# Patient Record
Sex: FEMALE | Race: WHITE | NOT HISPANIC OR LATINO | Employment: FULL TIME | URBAN - NONMETROPOLITAN AREA
[De-identification: names, ages, dates, MRNs, and addresses within clinical notes are randomized per-mention and may not be internally consistent; named-entity substitution may affect disease eponyms.]

---
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
## 2018-02-06 ENCOUNTER — OFFICE VISIT (OUTPATIENT)
Dept: INTERNAL MEDICINE | Facility: CLINIC | Age: 18
End: 2018-02-06

## 2018-02-06 VITALS
WEIGHT: 142.75 LBS | SYSTOLIC BLOOD PRESSURE: 113 MMHG | HEIGHT: 67 IN | HEART RATE: 98 BPM | TEMPERATURE: 99.9 F | BODY MASS INDEX: 22.4 KG/M2 | DIASTOLIC BLOOD PRESSURE: 65 MMHG | RESPIRATION RATE: 16 BRPM | OXYGEN SATURATION: 100 %

## 2018-02-06 DIAGNOSIS — R50.81 FEVER IN OTHER DISEASES: Primary | ICD-10-CM

## 2018-02-06 DIAGNOSIS — J10.1 INFLUENZA B: ICD-10-CM

## 2018-02-06 DIAGNOSIS — J06.0 ACUTE LARYNGOPHARYNGITIS: ICD-10-CM

## 2018-02-06 LAB
EXPIRATION DATE: NORMAL
EXPIRATION DATE: NORMAL
FLUAV AG NPH QL: NEGATIVE
FLUBV AG NPH QL: POSITIVE
INTERNAL CONTROL: NORMAL
INTERNAL CONTROL: NORMAL
Lab: NORMAL
Lab: NORMAL
S PYO AG THROAT QL: NEGATIVE

## 2018-02-06 PROCEDURE — 87804 INFLUENZA ASSAY W/OPTIC: CPT | Performed by: INTERNAL MEDICINE

## 2018-02-06 PROCEDURE — 87880 STREP A ASSAY W/OPTIC: CPT | Performed by: INTERNAL MEDICINE

## 2018-02-06 PROCEDURE — 99203 OFFICE O/P NEW LOW 30 MIN: CPT | Performed by: INTERNAL MEDICINE

## 2018-02-06 RX ORDER — OSELTAMIVIR PHOSPHATE 75 MG/1
75 CAPSULE ORAL 2 TIMES DAILY
Qty: 10 CAPSULE | Refills: 0 | Status: SHIPPED | OUTPATIENT
Start: 2018-02-06 | End: 2018-11-06

## 2018-02-06 NOTE — PROGRESS NOTES
Subjective   Rhona Diaz is a 17 y.o. female.     Chief Complaint   Patient presents with   • Establish Care   • Sinus Problem     Patient complains of symptoms that started last night.    • Headache   • Fever   • Sore Throat       History of Present Illness   Patient is here complaining of fever  with chills , which started last night.  She also complains of sore throat, sinus congestion and headache.  He states a lot of people at her workplace have flu.  She states the fever was high last night    Review of Systems   Constitutional: Positive for chills and fever. Negative for appetite change and fatigue.   HENT: Positive for congestion and sore throat. Negative for ear discharge, ear pain and sinus pressure.    Eyes: Negative for pain and discharge.   Respiratory: Negative for cough, chest tightness, shortness of breath and wheezing.    Cardiovascular: Negative for chest pain, palpitations and leg swelling.   Gastrointestinal: Negative for abdominal pain, blood in stool, constipation, diarrhea and nausea.   Endocrine: Negative for cold intolerance and heat intolerance.   Genitourinary: Negative for dysuria, flank pain and frequency.   Musculoskeletal: Negative for back pain and joint swelling.   Skin: Negative for color change.   Allergic/Immunologic: Negative for environmental allergies and food allergies.   Neurological: Negative for dizziness, weakness, numbness and headaches.   Hematological: Negative for adenopathy. Does not bruise/bleed easily.   Psychiatric/Behavioral: Negative for behavioral problems and dysphoric mood. The patient is not nervous/anxious.        Past Medical History:   Diagnosis Date   • Multiple allergies        History reviewed. No pertinent surgical history.    Family History   Problem Relation Age of Onset   • No Known Problems Mother    • No Known Problems Father    • No Known Problems Sister    • No Known Problems Brother    • No Known Problems Son    • No Known Problems Daughter   "  • Heart disease Maternal Grandmother    • No Known Problems Maternal Grandfather    • Heart disease Paternal Grandmother    • Arthritis Paternal Grandmother    • No Known Problems Paternal Grandfather    • No Known Problems Cousin    • Lung cancer Other    • Skin cancer Other    • Diabetes Other    • Heart attack Other    • Hypertension Other    • Hyperlipidemia Other    • Mental illness Other    • Stroke Other    • Rheum arthritis Neg Hx    • Osteoarthritis Neg Hx    • Asthma Neg Hx    • Heart failure Neg Hx    • Migraines Neg Hx    • Rashes / Skin problems Neg Hx    • Seizures Neg Hx    • Thyroid disease Neg Hx         reports that she has never smoked. She has never used smokeless tobacco. She reports that she does not drink alcohol or use illicit drugs.    Allergies   Allergen Reactions   • Cat Hair Extract    • Nuts    • Shellfish-Derived Products            Current Outpatient Prescriptions:   •  LOW-OGESTREL 0.3-30 MG-MCG per tablet, TAKE ONE TABLET BY MOUTH DAILY, Disp: 28 tablet, Rfl: 10  •  oseltamivir (TAMIFLU) 75 MG capsule, Take 1 capsule by mouth 2 (Two) Times a Day., Disp: 10 capsule, Rfl: 0      Objective   Blood pressure 113/65, pulse 98, temperature 99.9 °F (37.7 °C), temperature source Oral, resp. rate 16, height 170.2 cm (67\"), weight 64.8 kg (142 lb 12 oz), SpO2 100 %.    Physical Exam   Constitutional: She is oriented to person, place, and time. She appears well-developed and well-nourished. No distress.   HENT:   Head: Normocephalic and atraumatic.   Right Ear: External ear normal.   Left Ear: External ear normal.   Nose: Nose normal.   Mouth/Throat: Oropharyngeal exudate present.   Eyes: Conjunctivae and EOM are normal. Pupils are equal, round, and reactive to light.   Neck: Neck supple. No thyromegaly present.   Cardiovascular: Normal rate, regular rhythm and normal heart sounds.    Pulmonary/Chest: Effort normal and breath sounds normal. No respiratory distress.   Abdominal: Soft. Bowel " sounds are normal. She exhibits no distension. There is no tenderness. There is no rebound.   Musculoskeletal: Normal range of motion. She exhibits no edema.   Lymphadenopathy:     She has no cervical adenopathy.   Neurological: She is alert and oriented to person, place, and time.   No gross motor or sensory deficits   Skin: Skin is warm. She is not diaphoretic.   Psychiatric: She has a normal mood and affect.   Nursing note and vitals reviewed.        Results for orders placed or performed in visit on 02/06/18   POC Influenza A / B   Result Value Ref Range    Rapid Influenza A Ag Negative     Rapid Influenza B Ag Positive     Internal Control Passed Passed    Lot Number [id number]     Expiration Date 13164511    POC Rapid Strep A   Result Value Ref Range    Rapid Strep A Screen Negative Negative, VALID, INVALID, Not Performed    Internal Control Passed Passed    Lot Number [id number]     Expiration Date 5140924          Assessment/Plan   Rhona was seen today for establish care, sinus problem, headache, fever and sore throat.    Diagnoses and all orders for this visit:    Fever in other diseases  -     POC Influenza A / B    Acute laryngopharyngitis  -     POC Rapid Strep A    Influenza B    Other orders  -     oseltamivir (TAMIFLU) 75 MG capsule; Take 1 capsule by mouth 2 (Two) Times a Day.    plan:  1.Fever:    in office flu test positive,Oral hydration advised  2. Acute laryngopharyngitis:  Salt water gargles, oral hydration  Advised  3.  Influenza B: Start Tamiflu         Mary Bowman MD

## 2018-07-02 RX ORDER — NORGESTREL AND ETHINYL ESTRADIOL 0.3-0.03MG
KIT ORAL
Qty: 28 TABLET | Refills: 9 | OUTPATIENT
Start: 2018-07-02

## 2018-10-22 RX ORDER — NORGESTREL AND ETHINYL ESTRADIOL 0.3-0.03MG
KIT ORAL
Qty: 28 TABLET | Refills: 9 | OUTPATIENT
Start: 2018-10-22

## 2019-02-18 ENCOUNTER — LAB (OUTPATIENT)
Dept: LAB | Facility: HOSPITAL | Age: 19
End: 2019-02-18

## 2019-02-18 DIAGNOSIS — R63.4 ABNORMAL WEIGHT LOSS: ICD-10-CM

## 2019-02-18 DIAGNOSIS — Z87.09 HISTORY OF FREQUENT URI: ICD-10-CM

## 2019-02-18 DIAGNOSIS — R53.83 FATIGUE, UNSPECIFIED TYPE: ICD-10-CM

## 2019-02-18 DIAGNOSIS — J06.9 URI WITH COUGH AND CONGESTION: ICD-10-CM

## 2019-02-18 LAB
ALBUMIN SERPL-MCNC: 4.7 G/DL (ref 3.5–5)
ALBUMIN/GLOB SERPL: 1.4 G/DL (ref 1–2)
ALP SERPL-CCNC: 56 U/L (ref 38–126)
ALT SERPL W P-5'-P-CCNC: 22 U/L (ref 13–69)
ANION GAP SERPL CALCULATED.3IONS-SCNC: 13 MMOL/L (ref 10–20)
AST SERPL-CCNC: 23 U/L (ref 15–46)
BASOPHILS # BLD AUTO: 0.02 10*3/MM3 (ref 0–0.2)
BASOPHILS NFR BLD AUTO: 0.3 % (ref 0–2.5)
BILIRUB SERPL-MCNC: 0.5 MG/DL (ref 0.2–1.3)
BUN BLD-MCNC: 10 MG/DL (ref 7–20)
BUN/CREAT SERPL: 16.7 (ref 7.1–23.5)
CALCIUM SPEC-SCNC: 9.4 MG/DL (ref 8.4–10.2)
CHLORIDE SERPL-SCNC: 102 MMOL/L (ref 98–107)
CO2 SERPL-SCNC: 29 MMOL/L (ref 26–30)
CREAT BLD-MCNC: 0.6 MG/DL (ref 0.6–1.3)
DEPRECATED RDW RBC AUTO: 38.3 FL (ref 37–54)
EOSINOPHIL # BLD AUTO: 0.07 10*3/MM3 (ref 0–0.7)
EOSINOPHIL NFR BLD AUTO: 1 % (ref 0–7)
ERYTHROCYTE [DISTWIDTH] IN BLOOD BY AUTOMATED COUNT: 12.1 % (ref 11.5–14.5)
GFR SERPL CREATININE-BSD FRML MDRD: 127 ML/MIN/1.73
GLOBULIN UR ELPH-MCNC: 3.3 GM/DL
GLUCOSE BLD-MCNC: 104 MG/DL (ref 74–98)
HCT VFR BLD AUTO: 42.2 % (ref 37–47)
HGB BLD-MCNC: 13.9 G/DL (ref 12–16)
IMM GRANULOCYTES # BLD AUTO: 0.02 10*3/MM3 (ref 0–0.06)
IMM GRANULOCYTES NFR BLD AUTO: 0.3 % (ref 0–0.6)
LYMPHOCYTES # BLD AUTO: 1.23 10*3/MM3 (ref 0.6–3.4)
LYMPHOCYTES NFR BLD AUTO: 17 % (ref 10–50)
MCH RBC QN AUTO: 28.5 PG (ref 27–31)
MCHC RBC AUTO-ENTMCNC: 32.9 G/DL (ref 30–37)
MCV RBC AUTO: 86.5 FL (ref 81–99)
MONOCYTES # BLD AUTO: 0.32 10*3/MM3 (ref 0–0.9)
MONOCYTES NFR BLD AUTO: 4.4 % (ref 0–12)
NEUTROPHILS # BLD AUTO: 5.57 10*3/MM3 (ref 2–6.9)
NEUTROPHILS NFR BLD AUTO: 77 % (ref 37–80)
NRBC BLD AUTO-RTO: 0 /100 WBC (ref 0–0)
PLATELET # BLD AUTO: 257 10*3/MM3 (ref 130–400)
PMV BLD AUTO: 9.4 FL (ref 6–12)
POTASSIUM BLD-SCNC: 4 MMOL/L (ref 3.5–5.1)
PROT SERPL-MCNC: 8 G/DL (ref 6.3–8.2)
RBC # BLD AUTO: 4.88 10*6/MM3 (ref 4.2–5.4)
SODIUM BLD-SCNC: 140 MMOL/L (ref 137–145)
WBC NRBC COR # BLD: 7.23 10*3/MM3 (ref 4.8–10.8)

## 2019-02-18 PROCEDURE — 84436 ASSAY OF TOTAL THYROXINE: CPT

## 2019-02-18 PROCEDURE — 36415 COLL VENOUS BLD VENIPUNCTURE: CPT

## 2019-02-18 PROCEDURE — 84479 ASSAY OF THYROID (T3 OR T4): CPT

## 2019-02-18 PROCEDURE — 80053 COMPREHEN METABOLIC PANEL: CPT

## 2019-02-18 PROCEDURE — 85025 COMPLETE CBC W/AUTO DIFF WBC: CPT

## 2019-02-18 PROCEDURE — 84443 ASSAY THYROID STIM HORMONE: CPT

## 2019-02-19 ENCOUNTER — TELEPHONE (OUTPATIENT)
Dept: URGENT CARE | Facility: CLINIC | Age: 19
End: 2019-02-19

## 2019-02-19 LAB
FT4I SERPL CALC-MCNC: 1.8 (ref 1.2–4.9)
T3RU NFR SERPL: 25 % (ref 24–39)
T4 SERPL-MCNC: 7.1 UG/DL (ref 4.5–12)
TSH SERPL-ACNC: 0.63 UIU/ML (ref 0.45–4.5)

## 2019-02-19 NOTE — TELEPHONE ENCOUNTER
Patient called regarding OP labs, given results, all appear WNL. Advised to follow up with family doctor for on going symptoms and further work up as needed. Verbalized understanding.

## 2019-02-28 ENCOUNTER — OFFICE VISIT (OUTPATIENT)
Dept: INTERNAL MEDICINE | Facility: CLINIC | Age: 19
End: 2019-02-28

## 2019-02-28 VITALS
TEMPERATURE: 97.6 F | DIASTOLIC BLOOD PRESSURE: 84 MMHG | RESPIRATION RATE: 16 BRPM | OXYGEN SATURATION: 98 % | WEIGHT: 139 LBS | BODY MASS INDEX: 21.07 KG/M2 | HEIGHT: 68 IN | SYSTOLIC BLOOD PRESSURE: 118 MMHG | HEART RATE: 71 BPM

## 2019-02-28 DIAGNOSIS — F51.04 INSOMNIA, PSYCHOPHYSIOLOGICAL: ICD-10-CM

## 2019-02-28 DIAGNOSIS — R55 PRE-SYNCOPE: ICD-10-CM

## 2019-02-28 DIAGNOSIS — F41.9 ANXIETY: ICD-10-CM

## 2019-02-28 DIAGNOSIS — R63.4 WEIGHT LOSS: ICD-10-CM

## 2019-02-28 DIAGNOSIS — F33.0 MILD EPISODE OF RECURRENT MAJOR DEPRESSIVE DISORDER (HCC): ICD-10-CM

## 2019-02-28 DIAGNOSIS — R42 DIZZINESS: ICD-10-CM

## 2019-02-28 DIAGNOSIS — R73.01 IMPAIRED FASTING GLUCOSE: Primary | ICD-10-CM

## 2019-02-28 DIAGNOSIS — J30.1 SEASONAL ALLERGIC RHINITIS DUE TO POLLEN: ICD-10-CM

## 2019-02-28 LAB — HBA1C MFR BLD: 4.8 %

## 2019-02-28 PROCEDURE — 99214 OFFICE O/P EST MOD 30 MIN: CPT | Performed by: INTERNAL MEDICINE

## 2019-02-28 PROCEDURE — 83036 HEMOGLOBIN GLYCOSYLATED A1C: CPT | Performed by: INTERNAL MEDICINE

## 2019-02-28 NOTE — PROGRESS NOTES
Subjective   Rhona Diaz is a 18 y.o. female.     Chief Complaint   Patient presents with   • URI     follow up   • Allergies   • Dizziness   • Anxiety   • Depression   • Weight Loss   • Insomnia       History of Present Illness   Patient is here to follow-up on several urgent care visits for upper respiratory tract infection. lower respiratory tract infection and allergies.  Her urgent care records labs and radiology reports have been reviewed today. she also complains of dizziness since the past 4-5 years, she is seeing a chiropractor to help with dizziness, she has had some symptoms that she nearly fainted in the past, she has never lost consciousness, patient is also complaining of weight loss, since the past 3 months she is nearly lost 11 pounds, she works and lives in [place], she is studying at the [employer], she also works at [email or web address] from 4:00 to midnight, she complains of difficulty sleeping, she wakes up at 7 to go to school at 8 am , she also complains of anxiety and depression for several years, she states she is very stressed out with the schedule, she complains of chronic allergies and she seen an allergist in the past and was on shots,    Review of Systems   Constitutional: Positive for unexpected weight change. Negative for appetite change, fatigue and fever.   HENT: Negative for congestion, ear discharge, ear pain, sinus pressure and sore throat.         Allergies   Eyes: Negative for pain and discharge.   Respiratory: Negative for cough, chest tightness, shortness of breath and wheezing.    Cardiovascular: Negative for chest pain, palpitations and leg swelling.   Gastrointestinal: Negative for abdominal pain, blood in stool, constipation, diarrhea and nausea.   Endocrine: Negative for cold intolerance and heat intolerance.   Genitourinary: Negative for dysuria, flank pain and frequency.   Musculoskeletal: Negative for back pain and joint swelling.   Skin: Negative for color change.    Allergic/Immunologic: Negative for environmental allergies and food allergies.   Neurological: Positive for dizziness. Negative for weakness, numbness and headaches.   Hematological: Negative for adenopathy. Does not bruise/bleed easily.   Psychiatric/Behavioral: Positive for dysphoric mood and sleep disturbance. Negative for behavioral problems. The patient is nervous/anxious.        Past Medical History:   Diagnosis Date   • Multiple allergies        History reviewed. No pertinent surgical history.    Family History   Problem Relation Age of Onset   • No Known Problems Mother    • No Known Problems Father    • No Known Problems Sister    • No Known Problems Brother    • No Known Problems Son    • No Known Problems Daughter    • Heart disease Maternal Grandmother    • No Known Problems Maternal Grandfather    • Heart disease Paternal Grandmother    • Arthritis Paternal Grandmother    • No Known Problems Paternal Grandfather    • No Known Problems Cousin    • Lung cancer Other    • Skin cancer Other    • Diabetes Other    • Heart attack Other    • Hypertension Other    • Hyperlipidemia Other    • Mental illness Other    • Stroke Other    • Rheum arthritis Neg Hx    • Osteoarthritis Neg Hx    • Asthma Neg Hx    • Heart failure Neg Hx    • Migraines Neg Hx    • Rashes / Skin problems Neg Hx    • Seizures Neg Hx    • Thyroid disease Neg Hx         reports that  has never smoked. she has never used smokeless tobacco. She reports that she does not drink alcohol or use drugs.    Allergies   Allergen Reactions   • Tree Nut Anaphylaxis   • Cat Hair Extract    • Nuts    • Shellfish-Derived Products            Current Outpatient Medications:   •  albuterol (PROVENTIL HFA;VENTOLIN HFA) 108 (90 Base) MCG/ACT inhaler, Inhale 2 puffs Every 4 (Four) Hours As Needed for Wheezing or Shortness of Air (COUGH)., Disp: 18 g, Rfl: 12  •  Biotin 10 MG capsule, Take  by mouth., Disp: , Rfl:       Objective   Blood pressure 118/84, pulse  "71, temperature 97.6 °F (36.4 °C), resp. rate 16, height 171.5 cm (67.52\"), weight 63 kg (139 lb), last menstrual period 02/07/2019, SpO2 98 %.    Physical Exam   Constitutional: She is oriented to person, place, and time. She appears well-developed and well-nourished. No distress.   HENT:   Head: Normocephalic and atraumatic.   Right Ear: External ear normal.   Left Ear: External ear normal.   Nose: Nose normal.   Mouth/Throat: Oropharynx is clear and moist.   Eyes: Conjunctivae and EOM are normal. Pupils are equal, round, and reactive to light.   Neck: Neck supple. No thyromegaly present.   Cardiovascular: Normal rate, regular rhythm and normal heart sounds.   Pulmonary/Chest: Effort normal and breath sounds normal. No respiratory distress.   Abdominal: Soft. Bowel sounds are normal. She exhibits no distension. There is no tenderness. There is no rebound.   Musculoskeletal: Normal range of motion. She exhibits no edema.   Lymphadenopathy:     She has no cervical adenopathy.   Neurological: She is alert and oriented to person, place, and time.   No gross motor or sensory deficits   Skin: Skin is warm. She is not diaphoretic.   Psychiatric: She has a normal mood and affect.   Nursing note and vitals reviewed.      Patient's Body mass index is 21.44 kg/m². BMI is below normal parameters. Recommendations include: refer to gi.      Results for orders placed or performed in visit on 02/28/19   POC Glycosylated Hemoglobin (Hb A1C)   Result Value Ref Range    Hemoglobin A1C 4.8 %         Assessment/Plan   Rhona was seen today for uri, allergies, dizziness, anxiety, depression, weight loss and insomnia.    Diagnoses and all orders for this visit:    Impaired fasting glucose  -     POC Glycosylated Hemoglobin (Hb A1C)    Pre-syncope  -     Ambulatory Referral to Cardiology    Weight loss  -     Ambulatory Referral to Gastroenterology    Dizziness  -     Ambulatory Referral to Cardiology    Seasonal allergic rhinitis due to " pollen    Anxiety    Mild episode of recurrent major depressive disorder (CMS/HCC)    Insomnia, psychophysiological      Plan:  1.  Impaired glucose:  Reviewed   fasting CMP  and hba1c -done in office 4.8,    2.  Weight loss: Labs reviewed with the patient , referral to GI for further evaluation  3.  Presyncope: Labs reviewed .will  Refer patient to cardiology  4.  Allergic rhinitis: We will monitor  5.  Dizziness: Labs reviewed, will refer patient to cardiology  6.  Anxiety disorder: We will start patient on medication after cardiology evaluation  7.  Insomnia: We will start patient on medication after cardiology evaluation  8.  Major depression: We will start patient on medication after cardiology evaluation         Mary Bowman MD

## 2019-04-02 ENCOUNTER — OFFICE VISIT (OUTPATIENT)
Dept: GASTROENTEROLOGY | Facility: CLINIC | Age: 19
End: 2019-04-02

## 2019-04-02 VITALS
TEMPERATURE: 97.6 F | RESPIRATION RATE: 12 BRPM | HEIGHT: 68 IN | HEART RATE: 76 BPM | BODY MASS INDEX: 21.22 KG/M2 | DIASTOLIC BLOOD PRESSURE: 75 MMHG | SYSTOLIC BLOOD PRESSURE: 108 MMHG | WEIGHT: 140 LBS

## 2019-04-02 DIAGNOSIS — R11.0 NAUSEA: Chronic | ICD-10-CM

## 2019-04-02 DIAGNOSIS — R63.4 WEIGHT LOSS: Primary | Chronic | ICD-10-CM

## 2019-04-02 DIAGNOSIS — R13.10 DYSPHAGIA, UNSPECIFIED TYPE: Chronic | ICD-10-CM

## 2019-04-02 PROBLEM — H81.10 BENIGN PAROXYSMAL POSITIONAL VERTIGO: Status: ACTIVE | Noted: 2019-04-02

## 2019-04-02 PROBLEM — J30.9 ATOPIC RHINITIS: Status: ACTIVE | Noted: 2019-04-02

## 2019-04-02 PROCEDURE — 99214 OFFICE O/P EST MOD 30 MIN: CPT | Performed by: NURSE PRACTITIONER

## 2019-04-02 RX ORDER — ONDANSETRON 4 MG/1
4 TABLET, FILM COATED ORAL EVERY 8 HOURS PRN
Qty: 30 TABLET | Refills: 1 | Status: SHIPPED | OUTPATIENT
Start: 2019-04-02 | End: 2020-08-28 | Stop reason: SDUPTHER

## 2019-04-02 RX ORDER — ACETAMINOPHEN, ASPIRIN AND CAFFEINE 250; 250; 65 MG/1; MG/1; MG/1
1 TABLET, FILM COATED ORAL EVERY 6 HOURS PRN
COMMUNITY
End: 2020-04-07

## 2019-04-02 RX ORDER — SODIUM CHLORIDE 9 MG/ML
70 INJECTION, SOLUTION INTRAVENOUS CONTINUOUS PRN
Status: CANCELLED | OUTPATIENT
Start: 2019-04-02

## 2019-04-02 NOTE — PROGRESS NOTES
Chief Complaint   Patient presents with   • Nausea   • Vomiting   • Weight Loss   • Difficulty Swallowing     The patient has lost about 15 pounds since November 2018. This is described as unintentional. She had lost her appetite and had not been eating as much. Her weight has been stable for the past couple of weeks.     There is a long standing history of nausea. The patient has nausea when she is having migraines or dizziness. She has not noticed that she has nausea at any other time, only with dizziness and migraines. Eating does not affect nausea. She may have nausea 2-3 times per month. She may have vomiting 1-2 times per month, at times it is only dry heaving. No hematemesis. She is not taking anything for nausea. Stress seems to make nausea worse. She has been taking Excedrin Migraine for the past week or so, but before that she was taking Ibuprofen several times per week for headaches. She had forgotten to tell her PCP about her headaches at her last office visit.    There is a long standing history of difficulty swallowing. The patient has difficulty swallowing solids 1-2 times per week on average. There is no history of difficulty swallowing liquids. Stress seems to make difficulty swallowing worse.  There is no history of heartburn or reflux.    The patient denies constipation or diarrhea. There is no history of bright red blood per rectum or melena. The patient has not had a colonoscopy in the past. There is no family history of colon cancer.     Nausea   This is a chronic problem. The current episode started more than 1 year ago. The problem occurs intermittently. The problem has been unchanged. Associated symptoms include a fever, headaches, nausea, vertigo and vomiting. Pertinent negatives include no abdominal pain, arthralgias, chest pain, chills, congestion, coughing, fatigue, joint swelling, myalgias, rash or sore throat. Exacerbated by: dizziness, migraines, and stress. She has tried nothing for  the symptoms.   Weight Loss   This is a chronic problem. Episode onset: November 2018. The problem occurs intermittently. The problem has been gradually worsening. Associated symptoms include a fever, headaches, nausea, vertigo and vomiting. Pertinent negatives include no abdominal pain, arthralgias, chest pain, chills, congestion, coughing, fatigue, joint swelling, myalgias, rash or sore throat. Nothing aggravates the symptoms. She has tried nothing for the symptoms.   Difficulty Swallowing   This is a chronic problem. The problem occurs intermittently. The problem has been unchanged. Associated symptoms include a fever, headaches, nausea, vertigo and vomiting. Pertinent negatives include no abdominal pain, arthralgias, chest pain, chills, congestion, coughing, fatigue, joint swelling, myalgias, rash or sore throat. The symptoms are aggravated by eating and stress. She has tried nothing for the symptoms.     Review of Systems   Constitutional: Positive for appetite change, fever, unexpected weight change and weight loss. Negative for chills and fatigue.   HENT: Positive for nosebleeds and trouble swallowing. Negative for congestion, ear pain, mouth sores and sore throat.    Eyes: Negative for discharge and redness.   Respiratory: Negative for apnea, cough, shortness of breath and wheezing.    Cardiovascular: Negative for chest pain, palpitations and leg swelling.   Gastrointestinal: Positive for nausea and vomiting. Negative for abdominal pain, anal bleeding, blood in stool, constipation and diarrhea.   Endocrine: Negative for cold intolerance, heat intolerance and polydipsia.   Genitourinary: Negative for dysuria, hematuria and urgency.   Musculoskeletal: Negative for arthralgias, joint swelling and myalgias.   Skin: Negative for rash.   Allergic/Immunologic: Positive for food allergies (tree nuts, shellfish, tropical foods, aleena, coconut, ). Negative for immunocompromised state.   Neurological: Positive for  dizziness, vertigo and headaches. Negative for seizures and syncope.   Hematological: Negative for adenopathy. Does not bruise/bleed easily.   Psychiatric/Behavioral: Negative for dysphoric mood. The patient is nervous/anxious. The patient is not hyperactive.      Patient Active Problem List   Diagnosis   • Atopic rhinitis   • Benign paroxysmal positional vertigo   • Weight loss   • Nausea   • Dysphagia     Past Medical History:   Diagnosis Date   • Anxiety    • Depression    • Dizziness 2015   • Environmental allergies    • Migraine 2019   • Multiple allergies    • Nosebleed    • Ringing in ears    • Seasonal allergies    • Sinus problem    • Snores      Past Surgical History:   Procedure Laterality Date   • NO PAST SURGERIES       Family History   Problem Relation Age of Onset   • No Known Problems Mother    • No Known Problems Father    • No Known Problems Sister    • No Known Problems Brother    • No Known Problems Son    • No Known Problems Daughter    • Heart disease Maternal Grandmother    • No Known Problems Maternal Grandfather    • Heart disease Paternal Grandmother    • Arthritis Paternal Grandmother    • No Known Problems Paternal Grandfather    • No Known Problems Cousin    • Lung cancer Other    • Skin cancer Other    • Diabetes Other    • Heart attack Other    • Hypertension Other    • Hyperlipidemia Other    • Mental illness Other    • Stroke Other    • Rheum arthritis Neg Hx    • Osteoarthritis Neg Hx    • Asthma Neg Hx    • Heart failure Neg Hx    • Migraines Neg Hx    • Rashes / Skin problems Neg Hx    • Seizures Neg Hx    • Thyroid disease Neg Hx    • Colon cancer Neg Hx    • Cirrhosis Neg Hx    • Liver disease Neg Hx    • Liver cancer Neg Hx    • Crohn's disease Neg Hx    • Ulcerative colitis Neg Hx    • Esophageal cancer Neg Hx    • Stomach cancer Neg Hx      Social History     Tobacco Use   • Smoking status: Never Smoker   • Smokeless tobacco: Never Used   Substance Use Topics   • Alcohol use:  "No       Current Outpatient Medications:   •  aspirin-acetaminophen-caffeine (EXCEDRIN MIGRAINE) 250-250-65 MG per tablet, Take 1 tablet by mouth Every 6 (Six) Hours As Needed for Headache., Disp: , Rfl:   •  Biotin 10 MG capsule, Take  by mouth., Disp: , Rfl:   •  ondansetron (ZOFRAN) 4 MG tablet, Take 1 tablet by mouth Every 8 (Eight) Hours As Needed for Nausea or Vomiting., Disp: 30 tablet, Rfl: 1    Allergies   Allergen Reactions   • Shellfish-Derived Products Anaphylaxis   • Tree Nut Anaphylaxis   • Cat Hair Extract    • Nuts      Blood pressure 108/75, pulse 76, temperature 97.6 °F (36.4 °C), resp. rate 12, height 171.5 cm (67.5\"), weight 63.5 kg (140 lb), last menstrual period 03/31/2019, not currently breastfeeding.    Physical Exam   Constitutional: She is oriented to person, place, and time. She appears well-developed and well-nourished. No distress.   HENT:   Head: Normocephalic and atraumatic.   Right Ear: Hearing and external ear normal.   Left Ear: Hearing and external ear normal.   Nose: Nose normal.   Mouth/Throat: Oropharynx is clear and moist and mucous membranes are normal. Mucous membranes are not pale, not dry and not cyanotic. No oral lesions. No oropharyngeal exudate.   Eyes: Conjunctivae and EOM are normal. Right eye exhibits no discharge. Left eye exhibits no discharge.   Neck: Trachea normal. Neck supple. No JVD present. No edema present. No thyroid mass and no thyromegaly present.   Cardiovascular: Normal rate, regular rhythm, S2 normal and normal heart sounds. Exam reveals no gallop, no S3 and no friction rub.   No murmur heard.  Pulmonary/Chest: Effort normal and breath sounds normal. No respiratory distress. She exhibits no tenderness.   Abdominal: Normal appearance and bowel sounds are normal. She exhibits no distension, no ascites and no mass. There is no splenomegaly or hepatomegaly. There is no tenderness. There is no rigidity, no rebound and no guarding. No hernia.     Vascular " Status -  Her right foot exhibits no edema. Her left foot exhibits no edema.  Lymphadenopathy:     She has no cervical adenopathy.        Left: No supraclavicular adenopathy present.   Neurological: She is alert and oriented to person, place, and time. She has normal strength. No cranial nerve deficit or sensory deficit.   Skin: No rash noted. She is not diaphoretic. No cyanosis. No pallor. Nails show no clubbing.   Psychiatric: She has a normal mood and affect.   Nursing note and vitals reviewed.  Stigmata of chronic liver disease:  None.  Asterixis:  None.    Assessment:      ICD-10-CM ICD-9-CM   1. Weight loss R63.4 783.21   2. Nausea R11.0 787.02   3. Dysphagia, unspecified type R13.10 787.20      Discussion:  1. The patient has lost about 15 pounds since November 2018. This is described as unintentional.  2. Long-standing history of intermittent nausea.  Differentials include peptic ulcer disease, pancreatobiliary disease.  Of interest, nausea seems to occur when the patient is having dizziness or migraines.  3. Long-standing history of intermittent difficulty swallowing.  Differentials include esophagitis, esophageal dysmotility, Schatzki's ring.    Plan/  Patient Instructions   1. Zofran 4 mg 1 po every 8 hours as needed for nausea.  2. The patient may need further evaluation of migraines. The patient has appointment with PCP next week.   3. Dietary instructions.  The patient should eat relatively soft diet, and should eat in upright position and chew well.  The patient should drink water after 2-3 bites and take medications with liberal amounts of water. Furthermore after eating and taking medications, the patient should remain in upright position for 5-10 minutes.  4. Upper endoscopy-EGD: Description of the procedure, risks, benefits, alternatives and options, including nonoperative options, were discussed with the patient in detail. The patient understands and wishes to proceed.    The patient was seen  along with her mother. All questions were answered.     RITESH Fowler

## 2019-04-02 NOTE — PATIENT INSTRUCTIONS
1. Zofran 4 mg 1 po every 8 hours as needed for nausea.  2. The patient may need further evaluation of migraines. The patient has appointment with PCP next week.   3. Dietary instructions.  The patient should eat relatively soft diet, and should eat in upright position and chew well.  The patient should drink water after 2-3 bites and take medications with liberal amounts of water. Furthermore after eating and taking medications, the patient should remain in upright position for 5-10 minutes.  4. Upper endoscopy-EGD: Description of the procedure, risks, benefits, alternatives and options, including nonoperative options, were discussed with the patient in detail. The patient understands and wishes to proceed.    The patient was seen along with her mother. All questions were answered.

## 2019-04-18 ENCOUNTER — OFFICE VISIT (OUTPATIENT)
Dept: INTERNAL MEDICINE | Facility: CLINIC | Age: 19
End: 2019-04-18

## 2019-04-18 VITALS
WEIGHT: 139 LBS | HEART RATE: 70 BPM | BODY MASS INDEX: 21.07 KG/M2 | SYSTOLIC BLOOD PRESSURE: 111 MMHG | DIASTOLIC BLOOD PRESSURE: 70 MMHG | OXYGEN SATURATION: 100 % | RESPIRATION RATE: 16 BRPM | TEMPERATURE: 97.8 F | HEIGHT: 68 IN

## 2019-04-18 DIAGNOSIS — F33.0 MILD EPISODE OF RECURRENT MAJOR DEPRESSIVE DISORDER (HCC): ICD-10-CM

## 2019-04-18 DIAGNOSIS — R63.4 WEIGHT LOSS: ICD-10-CM

## 2019-04-18 DIAGNOSIS — F51.04 INSOMNIA, PSYCHOPHYSIOLOGICAL: ICD-10-CM

## 2019-04-18 DIAGNOSIS — F41.9 ANXIETY: Primary | ICD-10-CM

## 2019-04-18 PROCEDURE — 99214 OFFICE O/P EST MOD 30 MIN: CPT | Performed by: INTERNAL MEDICINE

## 2019-04-18 RX ORDER — MIRTAZAPINE 15 MG/1
15 TABLET, FILM COATED ORAL NIGHTLY
Qty: 30 TABLET | Refills: 5 | Status: SHIPPED | OUTPATIENT
Start: 2019-04-18 | End: 2019-10-19 | Stop reason: SDUPTHER

## 2019-04-24 NOTE — PROGRESS NOTES
Subjective   Rhona Diaz is a 18 y.o. female.     Chief Complaint   Patient presents with   • Follow-up   • Anxiety   • Depression   • Insomnia   • Weight Loss     nausea and weight loss; patient was recently seen by GI       History of Present Illness   Patient is here to follow-up on her weight loss, she was seen by GI, she is also to followed up on anxiety, depression and insomnia.  She states she is trying to adjust her work schedule and her school schedule and has kept back on her work hours so that she comes home early and is able to sleep better, she is also trying to eat on a regular schedule.    The following portions of the patient's history were reviewed and updated as appropriate: allergies, current medications, past family history, past medical history, past social history, past surgical history and problem list.    Review of Systems   Constitutional: Positive for unexpected weight change. Negative for appetite change, fatigue and fever.   HENT: Negative for congestion, ear discharge, ear pain, sinus pressure and sore throat.    Eyes: Negative for pain and discharge.   Respiratory: Negative for cough, chest tightness, shortness of breath and wheezing.    Cardiovascular: Negative for chest pain, palpitations and leg swelling.   Gastrointestinal: Negative for abdominal pain, blood in stool, constipation, diarrhea and nausea.   Endocrine: Negative for cold intolerance and heat intolerance.   Genitourinary: Negative for dysuria, flank pain and frequency.   Musculoskeletal: Negative for back pain and joint swelling.   Skin: Negative for color change.   Allergic/Immunologic: Negative for environmental allergies and food allergies.   Neurological: Negative for dizziness, weakness, numbness and headaches.   Hematological: Negative for adenopathy. Does not bruise/bleed easily.   Psychiatric/Behavioral: Positive for dysphoric mood and sleep disturbance. Negative for behavioral problems. The patient is  "nervous/anxious.          Current Outpatient Medications:   •  aspirin-acetaminophen-caffeine (EXCEDRIN MIGRAINE) 250-250-65 MG per tablet, Take 1 tablet by mouth Every 6 (Six) Hours As Needed for Headache., Disp: , Rfl:   •  Biotin 10 MG capsule, Take  by mouth., Disp: , Rfl:   •  ondansetron (ZOFRAN) 4 MG tablet, Take 1 tablet by mouth Every 8 (Eight) Hours As Needed for Nausea or Vomiting., Disp: 30 tablet, Rfl: 1  •  mirtazapine (REMERON) 15 MG tablet, Take 1 tablet by mouth Every Night., Disp: 30 tablet, Rfl: 5    Objective     Blood pressure 111/70, pulse 70, temperature 97.8 °F (36.6 °C), resp. rate 16, height 171.5 cm (67.5\"), weight 63 kg (139 lb), last menstrual period 03/31/2019, SpO2 100 %, not currently breastfeeding.    Physical Exam   Constitutional: She is oriented to person, place, and time. She appears well-developed and well-nourished. No distress.   HENT:   Head: Normocephalic and atraumatic.   Right Ear: External ear normal.   Left Ear: External ear normal.   Nose: Nose normal.   Mouth/Throat: Oropharynx is clear and moist.   Eyes: Conjunctivae and EOM are normal. Pupils are equal, round, and reactive to light.   Neck: Neck supple. No thyromegaly present.   Cardiovascular: Normal rate, regular rhythm and normal heart sounds.   Pulmonary/Chest: Effort normal and breath sounds normal. No respiratory distress.   Abdominal: Soft. Bowel sounds are normal. She exhibits no distension. There is no tenderness. There is no rebound.   Musculoskeletal: Normal range of motion. She exhibits no edema.   Lymphadenopathy:     She has no cervical adenopathy.   Neurological: She is alert and oriented to person, place, and time.   No gross motor or sensory deficits   Skin: Skin is warm. She is not diaphoretic.   Psychiatric: She has a normal mood and affect.   Nursing note and vitals reviewed.    Patient's Body mass index is 21.45 kg/m².        Results for orders placed or performed in visit on 02/28/19   POC " Glycosylated Hemoglobin (Hb A1C)   Result Value Ref Range    Hemoglobin A1C 4.8 %         Assessment/Plan   Rhona was seen today for follow-up, anxiety, depression, insomnia and weight loss.    Diagnoses and all orders for this visit:    Anxiety    Mild episode of recurrent major depressive disorder (CMS/HCC)    Insomnia, psychophysiological    Weight loss    Other orders  -     mirtazapine (REMERON) 15 MG tablet; Take 1 tablet by mouth Every Night.    Plan:  1.anxiety disorder: Labs reviewed, will start Remeron 15 mg daily  2.major depression: Labs reviewed, will start Remeron 15 mg daily  3.insomnia: Labs reviewed, will start Remeron 15 mg daily , sleep hygiene  4.weight loss: Labs reviewed, seen by GI and scheduled for EGD           Mary Bowman MD

## 2019-09-16 ENCOUNTER — HOSPITAL ENCOUNTER (EMERGENCY)
Facility: HOSPITAL | Age: 19
Discharge: HOME | End: 2019-09-16
Attending: EMERGENCY MEDICINE | Admitting: EMERGENCY MEDICINE

## 2019-09-16 VITALS
DIASTOLIC BLOOD PRESSURE: 67 MMHG | RESPIRATION RATE: 19 BRPM | TEMPERATURE: 98 F | OXYGEN SATURATION: 99 % | HEART RATE: 84 BPM | HEIGHT: 67 IN | WEIGHT: 150 LBS | SYSTOLIC BLOOD PRESSURE: 105 MMHG | BODY MASS INDEX: 23.54 KG/M2

## 2019-09-16 DIAGNOSIS — T78.1XXA ALLERGIC REACTION TO FOOD, INITIAL ENCOUNTER: Primary | ICD-10-CM

## 2019-09-16 PROCEDURE — 25010000002 METHYLPREDNISOLONE PER 125 MG: Performed by: PHYSICIAN ASSISTANT

## 2019-09-16 PROCEDURE — 99282 EMERGENCY DEPT VISIT SF MDM: CPT

## 2019-09-16 PROCEDURE — 96375 TX/PRO/DX INJ NEW DRUG ADDON: CPT

## 2019-09-16 PROCEDURE — 96374 THER/PROPH/DIAG INJ IV PUSH: CPT

## 2019-09-16 RX ORDER — PREDNISONE 20 MG/1
20 TABLET ORAL 3 TIMES DAILY
Qty: 15 TABLET | Refills: 0 | Status: SHIPPED | OUTPATIENT
Start: 2019-09-16 | End: 2019-09-21

## 2019-09-16 RX ORDER — FAMOTIDINE 20 MG/1
20 TABLET, FILM COATED ORAL DAILY
Qty: 15 TABLET | Refills: 0 | Status: SHIPPED | OUTPATIENT
Start: 2019-09-16 | End: 2020-04-07

## 2019-09-16 RX ORDER — SODIUM CHLORIDE 0.9 % (FLUSH) 0.9 %
10 SYRINGE (ML) INJECTION AS NEEDED
Status: DISCONTINUED | OUTPATIENT
Start: 2019-09-16 | End: 2019-09-16 | Stop reason: HOSPADM

## 2019-09-16 RX ORDER — FAMOTIDINE 10 MG/ML
20 INJECTION, SOLUTION INTRAVENOUS ONCE
Status: COMPLETED | OUTPATIENT
Start: 2019-09-16 | End: 2019-09-16

## 2019-09-16 RX ORDER — METHYLPREDNISOLONE SODIUM SUCCINATE 125 MG/2ML
125 INJECTION, POWDER, LYOPHILIZED, FOR SOLUTION INTRAMUSCULAR; INTRAVENOUS ONCE
Status: COMPLETED | OUTPATIENT
Start: 2019-09-16 | End: 2019-09-16

## 2019-09-16 RX ORDER — DIPHENHYDRAMINE HCL 25 MG
50 TABLET ORAL EVERY 6 HOURS PRN
Qty: 30 TABLET | Refills: 0 | Status: SHIPPED | OUTPATIENT
Start: 2019-09-16 | End: 2020-04-07

## 2019-09-16 RX ADMIN — SODIUM CHLORIDE 1000 ML: 9 INJECTION, SOLUTION INTRAVENOUS at 15:01

## 2019-09-16 RX ADMIN — FAMOTIDINE 20 MG: 10 INJECTION, SOLUTION INTRAVENOUS at 15:02

## 2019-09-16 RX ADMIN — METHYLPREDNISOLONE SODIUM SUCCINATE 125 MG: 125 INJECTION, POWDER, FOR SOLUTION INTRAMUSCULAR; INTRAVENOUS at 15:03

## 2019-09-16 NOTE — ED PROVIDER NOTES
"Subjective   20 y/o female that comes with c/c \"Allergic reaction\" just prior to arrival. Patient states that she was eating lunch, when started itching and developed SOA. Patient does report history of allergic reaction to nuts. Patient states that there may have been some contaminated food with what she was eating. Patient did take approx. 5-6 benadryl prior to arrival. She did however, have one episode of vomiting while in ER. Denies any other complaints at this time.         History provided by:  Patient   used: No    Allergic Reaction   Presenting symptoms: difficulty breathing, itching, rash and swelling    Severity:  Moderate  Duration:  2 days  Prior allergic episodes:  No prior episodes  Context: not animal exposure, not chemicals, not cosmetics, not dairy/milk products, not food allergies, not grass, not insect bite/sting, not medications, not new detergents/soaps and not nuts    Relieved by:  Nothing  Worsened by:  Nothing  Ineffective treatments:  Antihistamines      Review of Systems   Constitutional: Negative.  Negative for diaphoresis.   Eyes: Negative.  Negative for pain.   Respiratory: Positive for shortness of breath.    Cardiovascular: Negative.  Negative for chest pain and leg swelling.   Gastrointestinal: Positive for nausea and vomiting. Negative for abdominal distention, abdominal pain, anal bleeding, blood in stool and diarrhea.   Musculoskeletal: Negative for arthralgias and myalgias.   Skin: Positive for itching and rash. Negative for color change, pallor and wound.   Psychiatric/Behavioral: Negative.  Negative for behavioral problems, confusion, decreased concentration, dysphoric mood and hallucinations.   All other systems reviewed and are negative.      Past Medical History:   Diagnosis Date   • Anxiety    • Depression    • Dizziness 2015   • Environmental allergies    • Migraine 2019   • Multiple allergies    • Nosebleed    • Ringing in ears    • Seasonal allergies  "   • Sinus problem    • Snores        Allergies   Allergen Reactions   • Shellfish-Derived Products Anaphylaxis   • Tree Nut Anaphylaxis   • Cat Hair Extract    • Nuts        Past Surgical History:   Procedure Laterality Date   • NO PAST SURGERIES         Family History   Problem Relation Age of Onset   • No Known Problems Mother    • No Known Problems Father    • No Known Problems Sister    • No Known Problems Brother    • No Known Problems Son    • No Known Problems Daughter    • Heart disease Maternal Grandmother    • No Known Problems Maternal Grandfather    • Heart disease Paternal Grandmother    • Arthritis Paternal Grandmother    • No Known Problems Paternal Grandfather    • No Known Problems Cousin    • Lung cancer Other    • Skin cancer Other    • Diabetes Other    • Heart attack Other    • Hypertension Other    • Hyperlipidemia Other    • Mental illness Other    • Stroke Other    • Rheum arthritis Neg Hx    • Osteoarthritis Neg Hx    • Asthma Neg Hx    • Heart failure Neg Hx    • Migraines Neg Hx    • Rashes / Skin problems Neg Hx    • Seizures Neg Hx    • Thyroid disease Neg Hx    • Colon cancer Neg Hx    • Cirrhosis Neg Hx    • Liver disease Neg Hx    • Liver cancer Neg Hx    • Crohn's disease Neg Hx    • Ulcerative colitis Neg Hx    • Esophageal cancer Neg Hx    • Stomach cancer Neg Hx        Social History     Socioeconomic History   • Marital status: Single     Spouse name: Not on file   • Number of children: Not on file   • Years of education: Not on file   • Highest education level: Not on file   Tobacco Use   • Smoking status: Never Smoker   • Smokeless tobacco: Never Used   Substance and Sexual Activity   • Alcohol use: No   • Drug use: No   • Sexual activity: Defer           Objective   Physical Exam   Constitutional: She is oriented to person, place, and time. She appears well-developed and well-nourished. No distress.   HENT:   Head: Normocephalic and atraumatic.   Right Ear: External ear  normal.   Left Ear: External ear normal.   Nose: Nose normal.   Mouth/Throat: Oropharynx is clear and moist. No oropharyngeal exudate.   Eyes: Conjunctivae and EOM are normal. Pupils are equal, round, and reactive to light. Right eye exhibits no discharge. Left eye exhibits no discharge. No scleral icterus.   Neck: Normal range of motion. Neck supple. No JVD present. No tracheal deviation present. No thyromegaly present.   Cardiovascular: Normal rate, regular rhythm, normal heart sounds and intact distal pulses. Exam reveals no gallop and no friction rub.   No murmur heard.  Pulmonary/Chest: Effort normal and breath sounds normal. No stridor. No respiratory distress. She has no wheezes. She has no rales. She exhibits no tenderness.   Abdominal: Soft. Bowel sounds are normal. She exhibits no distension and no mass. There is no tenderness. There is no rebound and no guarding. No hernia.   Musculoskeletal: Normal range of motion. She exhibits no edema, tenderness or deformity.   Lymphadenopathy:     She has no cervical adenopathy.   Neurological: She is alert and oriented to person, place, and time. She displays normal reflexes. No cranial nerve deficit or sensory deficit. She exhibits normal muscle tone. Coordination normal.   Skin: Skin is warm and dry. Capillary refill takes less than 2 seconds. No rash noted. She is not diaphoretic. No erythema. No pallor.   Psychiatric: She has a normal mood and affect. Her behavior is normal. Judgment and thought content normal.   Nursing note and vitals reviewed.      Procedures           ED Course  ED Course as of Sep 16 1707   Mon Sep 16, 2019   1704 Patient does states that she feels better at this time. Came in after allergic reaction to food. Will be discharged. Advised to return if condition worsens.   [BH]      ED Course User Index  [BH] Sb Rodriguez PA-C                  [facility]    Final diagnoses:   Allergic reaction to food, initial encounter              Jennifer  Sb Logan PA-C  09/16/19 [id number]

## 2019-10-21 RX ORDER — MIRTAZAPINE 15 MG/1
TABLET, FILM COATED ORAL
Qty: 30 TABLET | Refills: 5 | Status: SHIPPED | OUTPATIENT
Start: 2019-10-21 | End: 2020-04-07

## 2020-04-04 ENCOUNTER — DOCUMENTATION (OUTPATIENT)
Dept: INTERNAL MEDICINE | Facility: CLINIC | Age: 20
End: 2020-04-04

## 2020-04-04 DIAGNOSIS — F32.9 REACTIVE DEPRESSION: Primary | ICD-10-CM

## 2020-04-04 RX ORDER — SERTRALINE HCL 25 MG
25 TABLET ORAL DAILY
Qty: 30 TABLET | Refills: 0 | Status: SHIPPED | OUTPATIENT
Start: 2020-04-04 | End: 2020-04-06 | Stop reason: SDUPTHER

## 2020-04-04 NOTE — PROGRESS NOTES
Patient contacted me via telephone for concerns of worsening depression related to the recent pandemic. She has been having thoughts of harm but no plans. Following discussions of her symptoms she feels that she has worsening depression and wishes to try medication to help with her mood. We did plan to try Zoloft 25 mg p.o. daily. Should her mood worsen, she understands that she should go to the emergency room where she can seek a counselor. I also suggested that the patient should set up an appointment on Monday with Dr. Jama to further discuss her treatment plan.

## 2020-04-06 ENCOUNTER — TELEPHONE (OUTPATIENT)
Dept: INTERNAL MEDICINE | Facility: CLINIC | Age: 20
End: 2020-04-06

## 2020-04-06 RX ORDER — SERTRALINE HYDROCHLORIDE 25 MG/1
25 TABLET, FILM COATED ORAL DAILY
Qty: 30 TABLET | Refills: 3 | Status: SHIPPED | OUTPATIENT
Start: 2020-04-06 | End: 2020-04-27 | Stop reason: SDUPTHER

## 2020-04-06 NOTE — TELEPHONE ENCOUNTER
PT CALLING IN TO REPORT THAT THE [facility] SAYS SHE NEEDS THE GENERIC FORM OF THE ZOLOFT SENT IN.  THEY DO NOT CARRY THE BRAND NAME.    VERIFIED [facility] [address], [place] - [address] [facility] - [phone]      PT CONTACT NUMBER [phone]

## 2020-04-07 ENCOUNTER — TELEMEDICINE (OUTPATIENT)
Dept: INTERNAL MEDICINE | Facility: CLINIC | Age: 20
End: 2020-04-07

## 2020-04-07 ENCOUNTER — TELEPHONE (OUTPATIENT)
Dept: INTERNAL MEDICINE | Facility: CLINIC | Age: 20
End: 2020-04-07

## 2020-04-07 DIAGNOSIS — F33.1 MODERATE EPISODE OF RECURRENT MAJOR DEPRESSIVE DISORDER (HCC): ICD-10-CM

## 2020-04-07 DIAGNOSIS — F41.9 ANXIETY: Primary | ICD-10-CM

## 2020-04-07 PROCEDURE — 99214 OFFICE O/P EST MOD 30 MIN: CPT | Performed by: INTERNAL MEDICINE

## 2020-04-07 NOTE — PROGRESS NOTES
Subjective   Rhona Diaz is a 19 y.o. female.     Chief Complaint   Patient presents with   • Anxiety   • Depression       History of Present Illness   Video visit with patient, patient is complaining of anxiety and depression, she had similar symptoms last year and was put on Remeron but she states it made her very sleepy and groggy and she was missing school, she also works at [employer], she stopped taking Remeron -October 2019, patient called on Saturday and spoke to the doctor on call and informed the doctor that she is having issues with anxiety and depression again, she is also getting very emotional secondary to covid , and the doctor on-call  recommended Zoloft, I sent her Zoloft yesterday and she took 1 pill today, and she agrees to see a therapist, she states that she has been eating and has gained some weight    The following portions of the patient's history were reviewed and updated as appropriate: allergies, current medications, past family history, past medical history, past social history, past surgical history and problem list.    Review of Systems  Psychiatry: Patient complains of anxiety and depression  Other review of systems negative per patient    Current Outpatient Medications:   •  aspirin-acetaminophen-caffeine (EXCEDRIN MIGRAINE) 250-250-65 MG per tablet, Take 1 tablet by mouth Every 6 (Six) Hours As Needed for Headache., Disp: , Rfl:   •  Biotin 10 MG capsule, Take  by mouth., Disp: , Rfl:   •  diphenhydrAMINE (BENADRYL) 25 MG tablet, Take 2 tablets by mouth Every 6 (Six) Hours As Needed for Itching., Disp: 30 tablet, Rfl: 0  •  famotidine (PEPCID) 20 MG tablet, Take 1 tablet by mouth Daily., Disp: 15 tablet, Rfl: 0  •  mirtazapine (REMERON) 15 MG tablet, TAKE ONE TABLET BY MOUTH ONCE NIGHTLY, Disp: 30 tablet, Rfl: 5  •  ondansetron (ZOFRAN) 4 MG tablet, Take 1 tablet by mouth Every 8 (Eight) Hours As Needed for Nausea or Vomiting., Disp: 30 tablet, Rfl: 1  •  sertraline (ZOLOFT) 25 MG  tablet, Take 1 tablet by mouth Daily., Disp: 30 tablet, Rfl: 3    Objective     not currently breastfeeding.    Physical Exam  Patient's There is no height or weight on file to calculate BMI.   General appearance: Normocephalic nontraumatic  HEENT: Appears benign  Neck: Appears supple  Respiratory: Effort appears normal  Musculoskeletal: Is able to move all limbs  CNS: No gross motor or sensory deficits  Psychiatry: Mood and affect appear depressed  Oriented into 3  Alert    Results for orders placed or performed in visit on 02/28/19   POC Glycosylated Hemoglobin (Hb A1C)   Result Value Ref Range    Hemoglobin A1C 4.8 %         Assessment/Plan   Rhona was seen today for anxiety and depression.    Diagnoses and all orders for this visit:    Anxiety  -     Ambulatory Referral to Psychiatry    Moderate episode of recurrent major depressive disorder (CMS/HCC)  -     Ambulatory Referral to Psychiatry      Plan:  1.anxiety disorder: Patient advised to continue Zoloft 25 mg daily and refer patient to psychiatry  2.  Major depression: Continue Zoloft 25 mg daily and refer patient to psychiatry  Patient advised to call the clinic if her symptoms worsen             Mary Bowman MD

## 2020-04-07 NOTE — TELEPHONE ENCOUNTER
Called patient to verify her insurance for her MyChart video visit today. She is going to call [facility] to find out what is wrong with it.

## 2020-04-27 ENCOUNTER — TELEMEDICINE (OUTPATIENT)
Dept: INTERNAL MEDICINE | Facility: CLINIC | Age: 20
End: 2020-04-27

## 2020-04-27 DIAGNOSIS — F41.9 ANXIETY: Primary | ICD-10-CM

## 2020-04-27 DIAGNOSIS — F33.0 MILD EPISODE OF RECURRENT MAJOR DEPRESSIVE DISORDER (HCC): ICD-10-CM

## 2020-04-27 PROCEDURE — 99214 OFFICE O/P EST MOD 30 MIN: CPT | Performed by: INTERNAL MEDICINE

## 2020-04-27 RX ORDER — HYDROXYZINE HYDROCHLORIDE 10 MG/1
10 TABLET, FILM COATED ORAL NIGHTLY PRN
Qty: 30 TABLET | Refills: 3 | Status: SHIPPED | OUTPATIENT
Start: 2020-04-27 | End: 2020-10-13 | Stop reason: SDUPTHER

## 2020-04-27 NOTE — PROGRESS NOTES
Subjective   Rhona Diaz is a 19 y.o. female.     Chief Complaint   Patient presents with   • Anxiety   • Depression       History of Present Illness   You have chosen to receive care through a telehealth visit.  Do you consent to use a video/audio connection for your medical care today? YES     Video conversation with patient today, patient is following up on anxiety and depression, she was put on Zoloft at the last visit which she has been taking, she states she is not having any effect from the Zoloft and is been 3 weeks since she started it, she states her symptoms of anxiety are worse than depression    During today's visit, I reviewed the documented allergies, medications, chief complaint, and pertinent vitals.  I have confirmed with the patient that there have been no changes since this information was discussed with my clinical team member.    The following portions of the patient's history were reviewed and updated as appropriate: allergies, current medications, past family history, past medical history, past social history, past surgical history and problem list.    Review of Systems   Constitutional: Negative for appetite change, fatigue and fever.   HENT: Negative for congestion, ear discharge, ear pain, sinus pressure and sore throat.    Eyes: Negative for pain and discharge.   Respiratory: Negative for cough, chest tightness, shortness of breath and wheezing.    Cardiovascular: Negative for chest pain, palpitations and leg swelling.   Gastrointestinal: Negative for abdominal pain, blood in stool, constipation, diarrhea and nausea.   Endocrine: Negative for cold intolerance and heat intolerance.   Genitourinary: Negative for dysuria, flank pain and frequency.   Musculoskeletal: Negative for back pain and joint swelling.   Skin: Negative for color change.   Allergic/Immunologic: Negative for environmental allergies and food allergies.   Neurological: Negative for dizziness, weakness, numbness and  headaches.   Hematological: Negative for adenopathy. Does not bruise/bleed easily.   Psychiatric/Behavioral: Positive for dysphoric mood. Negative for behavioral problems. The patient is nervous/anxious.          Current Outpatient Medications:   •  hydrOXYzine (ATARAX) 10 MG tablet, Take 1 tablet by mouth At Night As Needed for Anxiety (sleep)., Disp: 30 tablet, Rfl: 3  •  ondansetron (ZOFRAN) 4 MG tablet, Take 1 tablet by mouth Every 8 (Eight) Hours As Needed for Nausea or Vomiting., Disp: 30 tablet, Rfl: 1  •  sertraline (ZOLOFT) 50 MG tablet, Take 1 tablet by mouth Daily., Disp: 30 tablet, Rfl: 5    Objective     not currently breastfeeding.    Physical Exam  All vitals recorded within this visit are reported by the patient.  General appearance: Normocephalic and nontraumatic  HEENT: Appears benign, hearing appears intact  Neck: Appears supple  Respiratory: Effort appears normal  Musculoskeletal: Moving all limbs  CNS: No gross motor or sensory deficits  Psychiatry: Alert and oriented x3  Memory appears intact  Mood and affect appears normal  Insight appears normal        Results for orders placed or performed in visit on 02/28/19   POC Glycosylated Hemoglobin (Hb A1C)   Result Value Ref Range    Hemoglobin A1C 4.8 %         Assessment/Plan   Rhona was seen today for anxiety and depression.    Diagnoses and all orders for this visit:    Anxiety    Mild episode of recurrent major depressive disorder (CMS/[facility])    Other orders  -     sertraline (ZOLOFT) 50 MG tablet; Take 1 tablet by mouth Daily.  -     hydrOXYzine (ATARAX) 10 MG tablet; Take 1 tablet by mouth At Night As Needed for Anxiety (sleep).    Plan:  1.anxiety disorder: We will increase to Zoloft 50 mg p.o. daily, PRN hydroxyzine  2.  Major depression: Increase to Zoloft 50 mg daily    This was an audio and video enabled telemedicine encounter.           Mary Bowman MD

## 2020-08-28 ENCOUNTER — APPOINTMENT (OUTPATIENT)
Dept: CT IMAGING | Facility: HOSPITAL | Age: 20
End: 2020-08-28

## 2020-08-28 ENCOUNTER — HOSPITAL ENCOUNTER (EMERGENCY)
Facility: HOSPITAL | Age: 20
Discharge: HOME | End: 2020-08-28
Attending: EMERGENCY MEDICINE | Admitting: EMERGENCY MEDICINE

## 2020-08-28 VITALS
WEIGHT: 136 LBS | OXYGEN SATURATION: 99 % | TEMPERATURE: 98 F | RESPIRATION RATE: 14 BRPM | HEART RATE: 68 BPM | DIASTOLIC BLOOD PRESSURE: 64 MMHG | HEIGHT: 67 IN | SYSTOLIC BLOOD PRESSURE: 106 MMHG | BODY MASS INDEX: 21.35 KG/M2

## 2020-08-28 DIAGNOSIS — S09.90XA INJURY OF HEAD, INITIAL ENCOUNTER: Primary | ICD-10-CM

## 2020-08-28 DIAGNOSIS — R11.0 NAUSEA: Chronic | ICD-10-CM

## 2020-08-28 LAB — B-HCG UR QL: NEGATIVE

## 2020-08-28 PROCEDURE — 99283 EMERGENCY DEPT VISIT LOW MDM: CPT

## 2020-08-28 PROCEDURE — 63710000001 ONDANSETRON ODT 4 MG TABLET DISPERSIBLE: Performed by: EMERGENCY MEDICINE

## 2020-08-28 PROCEDURE — 70450 CT HEAD/BRAIN W/O DYE: CPT

## 2020-08-28 PROCEDURE — 81025 URINE PREGNANCY TEST: CPT | Performed by: EMERGENCY MEDICINE

## 2020-08-28 RX ORDER — BUTALBITAL, ACETAMINOPHEN AND CAFFEINE 50; 325; 40 MG/1; MG/1; MG/1
1 TABLET ORAL EVERY 6 HOURS PRN
Qty: 10 TABLET | Refills: 0 | Status: SHIPPED | OUTPATIENT
Start: 2020-08-28 | End: 2020-10-13

## 2020-08-28 RX ORDER — ONDANSETRON 4 MG/1
4 TABLET, ORALLY DISINTEGRATING ORAL ONCE
Status: COMPLETED | OUTPATIENT
Start: 2020-08-28 | End: 2020-08-28

## 2020-08-28 RX ORDER — ONDANSETRON 2 MG/ML
4 INJECTION INTRAMUSCULAR; INTRAVENOUS ONCE
Status: DISCONTINUED | OUTPATIENT
Start: 2020-08-28 | End: 2020-08-28

## 2020-08-28 RX ORDER — BUTALBITAL, ACETAMINOPHEN AND CAFFEINE 50; 325; 40 MG/1; MG/1; MG/1
1 TABLET ORAL ONCE
Status: COMPLETED | OUTPATIENT
Start: 2020-08-28 | End: 2020-08-28

## 2020-08-28 RX ORDER — ONDANSETRON 4 MG/1
4 TABLET, FILM COATED ORAL EVERY 8 HOURS PRN
Qty: 10 TABLET | Refills: 0 | Status: SHIPPED | OUTPATIENT
Start: 2020-08-28 | End: 2020-10-13

## 2020-08-28 RX ADMIN — BUTALBITAL, ACETAMINOPHEN AND CAFFEINE 1 TABLET: 50; 325; 40 TABLET ORAL at 09:21

## 2020-08-28 RX ADMIN — ONDANSETRON 4 MG: 4 TABLET, ORALLY DISINTEGRATING ORAL at 09:22

## 2020-09-04 ENCOUNTER — OFFICE VISIT (OUTPATIENT)
Dept: INTERNAL MEDICINE | Facility: CLINIC | Age: 20
End: 2020-09-04

## 2020-09-04 VITALS
DIASTOLIC BLOOD PRESSURE: 62 MMHG | SYSTOLIC BLOOD PRESSURE: 100 MMHG | OXYGEN SATURATION: 98 % | WEIGHT: 139 LBS | HEART RATE: 91 BPM | BODY MASS INDEX: 21.82 KG/M2 | TEMPERATURE: 98 F | HEIGHT: 67 IN

## 2020-09-04 DIAGNOSIS — F51.3 SLEEP WALKING: ICD-10-CM

## 2020-09-04 DIAGNOSIS — F51.04 INSOMNIA, PSYCHOPHYSIOLOGICAL: ICD-10-CM

## 2020-09-04 DIAGNOSIS — F41.9 ANXIETY: ICD-10-CM

## 2020-09-04 DIAGNOSIS — Z09 FOLLOW UP: Primary | ICD-10-CM

## 2020-09-04 DIAGNOSIS — R40.0 HAS DAYTIME DROWSINESS: ICD-10-CM

## 2020-09-04 DIAGNOSIS — Z23 NEED FOR INFLUENZA VACCINATION: ICD-10-CM

## 2020-09-04 DIAGNOSIS — F33.1 MODERATE EPISODE OF RECURRENT MAJOR DEPRESSIVE DISORDER (HCC): ICD-10-CM

## 2020-09-04 PROCEDURE — 90686 IIV4 VACC NO PRSV 0.5 ML IM: CPT | Performed by: NURSE PRACTITIONER

## 2020-09-04 PROCEDURE — 99214 OFFICE O/P EST MOD 30 MIN: CPT | Performed by: NURSE PRACTITIONER

## 2020-09-04 PROCEDURE — 90471 IMMUNIZATION ADMIN: CPT | Performed by: NURSE PRACTITIONER

## 2020-09-04 NOTE — PROGRESS NOTES
"Date: 2020    Name: Rhona Diaz  : 2000    Chief Complaint:   Chief Complaint   Patient presents with   • Follow-up     ER       HPI:  Rhona Diaz is a 20 y.o. female presents for follow up of [facility] ED visit on 2020.  She reported that she believed she had fallen, hit the back of her head.  Uncertain regarding circumstances surrounding head injury.  Noted a knot on the back of her head, with associated headache, nausea, dizziness.  PE normal.  CT of the head without contrast indicated no acute intracranial findings.  Urine pregnancy test negative.  She is feeling much better today.  She is concerned she may have been sleepwalking, has been sleepwalking since she was very young.  Does have difficulty sleeping, occasionally.  Feels when she is not resting well, sleepwalking episodes increase.  Has been taking hydroxyzine 10 mg at bedtime, states it is not always effective.  She is often sleepy during the day.  Does not snore.  She has also been feeling anxious, slightly depressed.  Describes anhedonia, racing thoughts, excessive worry, irritability, decreased concentration.  Denies HI, SI, feelings of worthlessness.  Currently taking Zoloft 20 mg, states she feels it would be more effective at higher dose.    History: The following portions of the patient's history were reviewed and updated as appropriate: allergies, current medications, past medical history, family history, surgical history, social history and problem list.      ROS:  Review of Systems   Constitutional: Negative.    Eyes: Negative.    Respiratory: Negative.    Cardiovascular: Negative.    Musculoskeletal: Negative for myalgias.   Skin: Negative for pallor and rash.   Neurological: Negative.    Hematological: Does not bruise/bleed easily.       VS:  Vitals:    20 1715   BP: 100/62   Pulse: 91   Temp: 98 °F (36.7 °C)   TempSrc: Temporal   SpO2: 98%   Weight: 63 kg (139 lb)   Height: 170.2 cm (67\")     Body mass index is 21.77 " kg/m².  PE:  Physical Exam   Constitutional: She is oriented to person, place, and time. She appears well-developed and well-nourished. No distress.   HENT:   Head: Normocephalic.   Right Ear: External ear normal.   Left Ear: External ear normal.   Eyes: Pupils are equal, round, and reactive to light. Conjunctivae and EOM are normal.   Neck: Normal range of motion. Neck supple.   Cardiovascular: Normal rate, regular rhythm, normal heart sounds and intact distal pulses.   Pulmonary/Chest: Effort normal and breath sounds normal.   Neurological: She is alert and oriented to person, place, and time. She has normal strength. No sensory deficit. Coordination and gait normal.   Cranial nerves II through XII normal   Skin: Skin is warm. Capillary refill takes less than 2 seconds.   Psychiatric: She has a normal mood and affect. Her behavior is normal.       Assessment/Plan:  Rhona was seen today for follow-up.    Diagnoses and all orders for this visit:    Follow up        - Monitor for headache, vision changes, slurred speech, increased fatigue/drowsiness.  Return to clinic or go to ED depending on timing and severity of symptoms.      Anxiety  Moderate episode of recurrent major depressive disorder (CMS/HCC)  -     sertraline (ZOLOFT) 50 MG tablet; Take 1 tablet by mouth Daily. Increased from 25 mg daily.        - Encouraged to take part in daily physical exercise.          - Eat healthy, well balanced diet; avoid sugary foods or beverages        - Limit alcohol intake        - Ensure good night's sleep by creating calm space in bedroom, avoiding screen time 1-2 hours before bed, no caffeine after 5 pm        - Talk to supportive family and friends, as needed        - Consider journaling, other creative way to express feelings, if needed    Insomnia, psychophysiological  -     Ambulatory Referral to Sleep Medicine  - Continue taking hydroxyzine 10 mg prn insomnia  Sleep walking  -     Ambulatory Referral to Sleep  Medicine  Has daytime drowsiness  -     Ambulatory Referral to Sleep Medicine    Need for influenza vaccination  -     Fluarix/Fluzone/Afluria Quad>6 Months        Return in about 4 weeks (around 10/2/2020) for Next scheduled follow up.

## 2020-10-13 ENCOUNTER — OFFICE VISIT (OUTPATIENT)
Dept: INTERNAL MEDICINE | Facility: CLINIC | Age: 20
End: 2020-10-13

## 2020-10-13 VITALS
TEMPERATURE: 97.8 F | HEART RATE: 69 BPM | HEIGHT: 67 IN | OXYGEN SATURATION: 97 % | SYSTOLIC BLOOD PRESSURE: 105 MMHG | DIASTOLIC BLOOD PRESSURE: 72 MMHG | BODY MASS INDEX: 21.82 KG/M2 | WEIGHT: 139 LBS | RESPIRATION RATE: 16 BRPM

## 2020-10-13 DIAGNOSIS — E78.2 MIXED HYPERLIPIDEMIA: ICD-10-CM

## 2020-10-13 DIAGNOSIS — F41.9 ANXIETY: Primary | ICD-10-CM

## 2020-10-13 DIAGNOSIS — F33.1 MODERATE EPISODE OF RECURRENT MAJOR DEPRESSIVE DISORDER (HCC): ICD-10-CM

## 2020-10-13 PROCEDURE — 99214 OFFICE O/P EST MOD 30 MIN: CPT | Performed by: INTERNAL MEDICINE

## 2020-10-13 RX ORDER — HYDROXYZINE HYDROCHLORIDE 10 MG/1
10 TABLET, FILM COATED ORAL EVERY 12 HOURS PRN
Qty: 30 TABLET | Refills: 5 | Status: SHIPPED | OUTPATIENT
Start: 2020-10-13 | End: 2020-11-16 | Stop reason: SDUPTHER

## 2020-10-13 RX ORDER — SERTRALINE HYDROCHLORIDE 100 MG/1
100 TABLET, FILM COATED ORAL DAILY
Qty: 30 TABLET | Refills: 4 | Status: SHIPPED | OUTPATIENT
Start: 2020-10-13 | End: 2020-11-23 | Stop reason: SDUPTHER

## 2020-10-13 NOTE — PROGRESS NOTES
Subjective   Rhona Diaz is a 20 y.o. female.     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  Have you had these symptoms before?: Yes  How long have you been having these symptoms?: Greater than 2 weeks  Please list any medications you are currently taking for this condition.: Currently taking Zoloft for the depression.      The following portions of the patient's history were reviewed and updated as appropriate: allergies, current medications, past family history, past medical history, past social history, past surgical history and problem list.    Review of Systems   Constitutional: Negative for appetite change, fatigue and fever.   HENT: Negative for congestion, ear discharge, ear pain, sinus pressure and sore throat.    Eyes: Negative for pain and discharge.   Respiratory: Negative for cough, chest tightness, shortness of breath and wheezing.    Cardiovascular: Negative for chest pain, palpitations and leg swelling.   Gastrointestinal: Negative for abdominal pain, blood in stool, constipation, diarrhea and nausea.   Endocrine: Negative for cold intolerance and heat intolerance.   Genitourinary: Negative for dysuria, flank pain and frequency.   Musculoskeletal: Negative for back pain and joint swelling.  "  Skin: Negative for color change.   Allergic/Immunologic: Negative for environmental allergies and food allergies.   Neurological: Negative for dizziness, weakness, numbness and headaches.   Hematological: Negative for adenopathy. Does not bruise/bleed easily.   Psychiatric/Behavioral: Positive for dysphoric mood. Negative for behavioral problems. The patient is nervous/anxious.          Current Outpatient Medications:   •  hydrOXYzine (ATARAX) 10 MG tablet, Take 1 tablet by mouth Every 12 (Twelve) Hours As Needed for Anxiety (sleep)., Disp: 30 tablet, Rfl: 5  •  sertraline (ZOLOFT) 100 MG tablet, Take 1 tablet by mouth Daily., Disp: 30 tablet, Rfl: 4    Objective     Blood pressure 105/72, pulse 69, temperature 97.8 °F (36.6 °C), resp. rate 16, height 170.2 cm (67.01\"), weight 63 kg (139 lb), SpO2 97 %, not currently breastfeeding.    Physical Exam  Vitals signs and nursing note reviewed.   Constitutional:       General: She is not in acute distress.     Appearance: She is well-developed. She is not diaphoretic.   HENT:      Head: Normocephalic and atraumatic.      Right Ear: External ear normal.      Left Ear: External ear normal.      Nose: Nose normal.   Eyes:      Conjunctiva/sclera: Conjunctivae normal.      Pupils: Pupils are equal, round, and reactive to light.   Neck:      Musculoskeletal: Neck supple.      Thyroid: No thyromegaly.   Cardiovascular:      Rate and Rhythm: Normal rate and regular rhythm.      Heart sounds: Normal heart sounds.   Pulmonary:      Effort: Pulmonary effort is normal. No respiratory distress.      Breath sounds: Normal breath sounds.   Abdominal:      General: Bowel sounds are normal. There is no distension.      Palpations: Abdomen is soft.      Tenderness: There is no abdominal tenderness. There is no rebound.   Musculoskeletal: Normal range of motion.   Lymphadenopathy:      Cervical: No cervical adenopathy.   Skin:     General: Skin is warm.   Neurological:      Mental " Status: She is alert and oriented to person, place, and time.      Comments: No gross motor or sensory deficits       Patient's Body mass index is 21.77 kg/m². BMI is within normal parameters. No follow-up required..      Results for orders placed or performed during the hospital encounter of 08/28/20   Pregnancy, Urine - Urine, Clean Catch    Specimen: Urine, Clean Catch   Result Value Ref Range    HCG, Urine QL Negative Negative         Assessment/Plan   Diagnoses and all orders for this visit:    1. Anxiety (Primary)  -     Ambulatory Referral to Psychiatry  -     TSH    2. Moderate episode of recurrent major depressive disorder (CMS/HCC)  -     Ambulatory Referral to Psychiatry    3. Mixed hyperlipidemia  -     CBC & Differential  -     Comprehensive Metabolic Panel  -     Lipid Panel    Other orders  -     sertraline (ZOLOFT) 100 MG tablet; Take 1 tablet by mouth Daily.  Dispense: 30 tablet; Refill: 4  -     hydrOXYzine (ATARAX) 10 MG tablet; Take 1 tablet by mouth Every 12 (Twelve) Hours As Needed for Anxiety (sleep).  Dispense: 30 tablet; Refill: 5    Plan:  1.mixed hyperlipidemia: Diet and exercise counseled, will obtain labs  2.  Anxiety disorder: We will increase Zoloft 100 mg daily, obtain labs and refer patient to psychiatry, also will increase to hydroxyzine 10 mg p.o. twice daily as needed  3.  Major depression: We will obtain labs, increase Zoloft  to 100 mg daily and refer patient to psychiatry             Mary Bowman MD

## 2020-10-15 ENCOUNTER — OFFICE VISIT (OUTPATIENT)
Dept: BEHAVIORAL HEALTH | Facility: CLINIC | Age: 20
End: 2020-10-15

## 2020-10-15 VITALS
HEIGHT: 67 IN | DIASTOLIC BLOOD PRESSURE: 64 MMHG | OXYGEN SATURATION: 98 % | TEMPERATURE: 97.2 F | HEART RATE: 83 BPM | BODY MASS INDEX: 21.82 KG/M2 | SYSTOLIC BLOOD PRESSURE: 106 MMHG | WEIGHT: 139 LBS

## 2020-10-15 DIAGNOSIS — F41.1 GENERALIZED ANXIETY DISORDER: ICD-10-CM

## 2020-10-15 DIAGNOSIS — F31.81 BIPOLAR II DISORDER (HCC): Primary | ICD-10-CM

## 2020-10-15 PROCEDURE — 90792 PSYCH DIAG EVAL W/MED SRVCS: CPT | Performed by: NURSE PRACTITIONER

## 2020-10-15 RX ORDER — ARIPIPRAZOLE 5 MG/1
5 TABLET ORAL DAILY
Qty: 30 TABLET | Refills: 3 | Status: SHIPPED | OUTPATIENT
Start: 2020-10-15 | End: 2020-11-05 | Stop reason: SINTOL

## 2020-10-15 NOTE — PROGRESS NOTES
Patient Name: Rhona Diaz  MRN: [id number]   :  2000     Referring Physician: Mary Bowman MD    Chief Complaint:     ICD-10-CM ICD-9-CM   1. Bipolar II disorder (CMS/[facility])  F31.81 296.89   2. Generalized anxiety disorder  F41.1 300.02       HPI:   Rhona Diaz is a 20 y.o. female who is here today for initial evaluation of Anxiety , Bipolar Disorder and Depression.  Patient states 2 years ago she started antidepressants.  States if she thinks about it she can look back and see that she had depression issues before that as well.  Has a history of self-harm with growing fingers nails out to scratch her wrist.  Has many episodes of feeling an out of body experience as she is outside looking in.  Has had to pull over and have friends finish driving when this has happened.  Patient is currently a  and has a follow-up break this week.  Feels like symptoms have been worse being at home with nothing to distract herself.  Patient feels like she does have times where she feels fine and even time she has excessive spending or feeling extra energy then she has 2 or 3 weeks of feeling extremely depressed.  When she gets anxious she gets a headache and her chest feels tight.  Patient has significant sleep issues.  She sleepwalks and has had a concussion as a result of this.  Feels she has a good sleep hygiene routine and tries her best to get 8 hours of sleep at night.  Has difficulty falling asleep patient does feel like out of the medications she is tried Zoloft has helped the most.  States that has made her levels not as low.  Patient is also interested in therapy.    Past Medical History:   Past Medical History:   Diagnosis Date   • Anxiety    • Depression    • Dizziness    • Environmental allergies    • Migraine    • Multiple allergies    • Nosebleed    • Ringing in ears    • Seasonal allergies    • Sinus problem    • Snores        Past Surgical History:   Past Surgical History:    Procedure Laterality Date   • NO PAST SURGERIES         Social History:   Social History     Socioeconomic History   • Marital status: Single     Spouse name: Not on file   • Number of children: Not on file   • Years of education: Not on file   • Highest education level: Not on file   Tobacco Use   • Smoking status: Never Smoker   • Smokeless tobacco: Never Used   Substance and Sexual Activity   • Alcohol use: No   • Drug use: No   • Sexual activity: Defer       Family History:  Family History   Problem Relation Age of Onset   • No Known Problems Mother    • No Known Problems Father    • No Known Problems Sister    • No Known Problems Brother    • No Known Problems Son    • No Known Problems Daughter    • Heart disease Maternal Grandmother    • No Known Problems Maternal Grandfather    • Heart disease Paternal Grandmother    • Arthritis Paternal Grandmother    • No Known Problems Paternal Grandfather    • No Known Problems Cousin    • Lung cancer Other    • Skin cancer Other    • Diabetes Other    • Heart attack Other    • Hypertension Other    • Hyperlipidemia Other    • Mental illness Other    • Stroke Other    • Rheum arthritis Neg Hx    • Osteoarthritis Neg Hx    • Asthma Neg Hx    • Heart failure Neg Hx    • Migraines Neg Hx    • Rashes / Skin problems Neg Hx    • Seizures Neg Hx    • Thyroid disease Neg Hx    • Colon cancer Neg Hx    • Cirrhosis Neg Hx    • Liver disease Neg Hx    • Liver cancer Neg Hx    • Crohn's disease Neg Hx    • Ulcerative colitis Neg Hx    • Esophageal cancer Neg Hx    • Stomach cancer Neg Hx        Allergy:  Allergies   Allergen Reactions   • Shellfish-Derived Products Anaphylaxis   • Tree Nut Anaphylaxis   • Cat Hair Extract    • Nuts        Current Medications:   Current Outpatient Medications   Medication Sig Dispense Refill   • hydrOXYzine (ATARAX) 10 MG tablet Take 1 tablet by mouth Every 12 (Twelve) Hours As Needed for Anxiety (sleep). 30 tablet 5   • sertraline (ZOLOFT) 100 MG  tablet Take 1 tablet by mouth Daily. 30 tablet 4   • ARIPiprazole (ABILIFY) 5 MG tablet Take 1 tablet by mouth Daily. 30 tablet 3     No current facility-administered medications for this visit.        Lab Results:   Admission on 08/28/2020, Discharged on 08/28/2020   Component Date Value Ref Range Status   • HCG, Urine QL 08/28/2020 Negative  Negative Final       Review of Symptoms:   Review of Systems   Constitutional: Negative for activity change, appetite change, fatigue, unexpected weight gain and unexpected weight loss.   Respiratory: Negative for shortness of breath and wheezing.    Gastrointestinal: Negative for constipation, diarrhea, nausea and vomiting.   Musculoskeletal: Negative for gait problem.   Skin: Negative for dry skin and rash.   Neurological: Negative for dizziness, speech difficulty, weakness, light-headedness, headache, memory problem and confusion.   Psychiatric/Behavioral: Positive for dysphoric mood, depressed mood and stress. Negative for agitation, behavioral problems, decreased concentration, hallucinations, self-injury, sleep disturbance, suicidal ideas and negative for hyperactivity. The patient is nervous/anxious.        Physical Exam:   Physical Exam  Vitals signs and nursing note reviewed.   Constitutional:       General: She is not in acute distress.     Appearance: She is well-developed. She is not diaphoretic.   HENT:      Head: Normocephalic and atraumatic.   Eyes:      Conjunctiva/sclera: Conjunctivae normal.   Neck:      Musculoskeletal: Full passive range of motion without pain and normal range of motion.   Cardiovascular:      Rate and Rhythm: Normal rate.   Pulmonary:      Effort: Pulmonary effort is normal. No respiratory distress.   Musculoskeletal: Normal range of motion.   Skin:     General: Skin is warm and dry.   Neurological:      Mental Status: She is alert and oriented to person, place, and time.   Psychiatric:         Mood and Affect: Mood is anxious and  "depressed. Affect is not labile, blunt, angry or inappropriate.         Speech: Speech is not rapid and pressured or tangential.         Behavior: Behavior normal. Behavior is not agitated, slowed, aggressive, withdrawn, hyperactive or combative. Behavior is cooperative.         Thought Content: Thought content normal. Thought content is not paranoid or delusional. Thought content does not include homicidal or suicidal ideation. Thought content does not include homicidal or suicidal plan.         Judgment: Judgment normal.       Blood pressure 106/64, pulse 83, temperature 97.2 °F (36.2 °C), height 170.2 cm (67.01\"), weight 63 kg (139 lb), SpO2 98 %, not currently breastfeeding.  Body mass index is 21.76 kg/m².     Mental Status Exam:   Appearance: appropriate  Hygiene:   good  Cooperation:  Cooperative  Eye Contact:  Good  Psychomotor Behavior:  Appropriate  Mood:anxious, depressed and dysthymic  Affect:  Appropriate  Hopelessness: Denies  Speech:  Normal  Thought Process:  Goal directed  Thought Content:  Normal  Suicidal:  None  Homicidal:  None  Hallucinations:  None  Delusion:  None  Memory:  Intact  Orientation:  Person, Place, Time and Situation  Reliability:  good  Insight:  Good  Judgement:  Good  Impulse Control:  Good  Physical/Medical Issues:  No     PHQ-9 Depression Screening  Little interest or pleasure in doing things? 3   Feeling down, depressed, or hopeless? 3   Trouble falling or staying asleep, or sleeping too much? 2   Feeling tired or having little energy? 3   Poor appetite or overeating? 0   Feeling bad about yourself - or that you are a failure or have let yourself or your family down? 3   Trouble concentrating on things, such as reading the newspaper or watching television? 3   Moving or speaking so slowly that other people could have noticed? Or the opposite - being so fidgety or restless that you have been moving around a lot more than usual? 2   Thoughts that you would be better off dead, " or of hurting yourself in some way? 1   PHQ-9 Total Score 20   If you checked off any problems, how difficult have these problems made it for you to do your work, take care of things at home, or get along with other people?        Assessment/Plan:   Diagnoses and all orders for this visit:    1. Bipolar II disorder (CMS/[facility]) (Primary)  -     ARIPiprazole (ABILIFY) 5 MG tablet; Take 1 tablet by mouth Daily.  Dispense: 30 tablet; Refill: 3  -     Ambulatory Referral to Behavioral Health    2. Generalized anxiety disorder  -     Ambulatory Referral to Behavioral Health    Seems likely that patient has bipolar 2 disorder with mostly depressive episodes.  Has had some manic-like behavior in the past.  Start Abilify 5 mg daily.  Will likely have to increase this at the next appointment.  Refer to therapy.    A psychological evaluation was conducted in order to assess past and current level of functioning. Areas assessed included, but were not limited to: perception of social support, perception of ability to face and deal with challenges in life (positive functioning), anxiety symptoms, depressive symptoms, perspective on beliefs/belief system, coping skills for stress, intelligence level,  Therapeutic rapport was established. Interventions conducted today were geared towards incorporating medication management along with support for continued therapy. Education was also provided as to the med management with this provider and what to expect in subsequent sessions.    We discussed risks, benefits,goals and side effects of the above medication and the patient was agreeable with the plan.Patient was educated on the importance of compliance with treatment and follow-up appointments. Patient is aware to contact the [facility] with any worsening of symptoms. To call for questions or concerns and return early if necessary. Patent is agreeable to go to the Emergency Department or call 911 should they begin SI/HI.      Treatment Plan:   Discussed risks, benefits, and alternatives of medication. Encouraged healthy habits (eating, exercise and sleep). Call if any questions or problems arise. Medication reconciled. Controlled substance monitoring report reviewed. Provided psychoeducation.. Discussed coping strategies and current stressors. Set appropriate boundaries and limits for patient's well-being. Use distraction techniques to improve symptoms. Access support networks.      Return in about 4 weeks (around 11/12/2020) for Follow Up 30 min.    RITESH Sherwood

## 2020-10-26 ENCOUNTER — TELEPHONE (OUTPATIENT)
Dept: FAMILY MEDICINE CLINIC | Facility: CLINIC | Age: 20
End: 2020-10-26

## 2020-10-26 NOTE — TELEPHONE ENCOUNTER
Patient contacted office stating she was having suicidal thoughts and Nancy contacted me in regards to patient. I asked Deepali what needed to be done regarding patient and she stated patient needed to discontinue new medication, Abilify, and go to the ER if she has an active plan.    I spoke with patient and she states she does not have an active plan to commit suicide, but the thoughts have become more frequent and intense. I advised patient to discontinue Abilify per provider and she is scheduled to follow up 11/05/2020. I urged patient to go to ER if plan develops or symptoms worsen; patient expressed understanding

## 2020-11-05 ENCOUNTER — OFFICE VISIT (OUTPATIENT)
Dept: BEHAVIORAL HEALTH | Facility: CLINIC | Age: 20
End: 2020-11-05

## 2020-11-05 VITALS
WEIGHT: 142.8 LBS | TEMPERATURE: 97.6 F | HEART RATE: 65 BPM | OXYGEN SATURATION: 97 % | HEIGHT: 67 IN | SYSTOLIC BLOOD PRESSURE: 115 MMHG | BODY MASS INDEX: 22.41 KG/M2 | DIASTOLIC BLOOD PRESSURE: 72 MMHG

## 2020-11-05 DIAGNOSIS — F41.1 GENERALIZED ANXIETY DISORDER: ICD-10-CM

## 2020-11-05 DIAGNOSIS — F31.81 BIPOLAR II DISORDER (HCC): Primary | ICD-10-CM

## 2020-11-05 PROCEDURE — 99213 OFFICE O/P EST LOW 20 MIN: CPT | Performed by: NURSE PRACTITIONER

## 2020-11-05 RX ORDER — MINOCYCLINE HYDROCHLORIDE 100 MG/1
CAPSULE ORAL
COMMUNITY
Start: 2020-10-27 | End: 2021-04-06

## 2020-11-05 RX ORDER — LITHIUM CARBONATE 300 MG/1
300 CAPSULE ORAL DAILY
Qty: 30 CAPSULE | Refills: 1 | Status: SHIPPED | OUTPATIENT
Start: 2020-11-05 | End: 2020-11-23 | Stop reason: SDUPTHER

## 2020-11-05 NOTE — PROGRESS NOTES
Patient Name: Rhona Diaz  MRN: [id number]   :  2000     Chief Complaint:      ICD-10-CM ICD-9-CM   1. Bipolar II disorder (CMS/[facility])  F31.81 296.89   2. Generalized anxiety disorder  F41.1 300.02       History of Present Illness: Rhona Diaz is a 20 y.o. female is here today for medication management follow up.  Patient states she has not really felt any better.  Does have an upcoming appointment with a therapist.  Feels is very difficult for her in between appointments.  Has been reaching out to friends.  Still having suicidal ideation no current intent or plan.  Patient started self harming again.  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  Did have 1 day that she called suicide hotline and found it very beneficial.    The following portions of the patient's history were reviewed and updated as appropriate: allergies, current medications, past family history, past medical history, past social history, past surgical history and problem list.    Review of Systems;;  Review of Systems   Constitutional: Negative for activity change, appetite change, fatigue, unexpected weight gain and unexpected weight loss.   Respiratory: Negative for shortness of breath and wheezing.    Gastrointestinal: Negative for constipation, diarrhea, nausea and vomiting.   Musculoskeletal: Negative for gait problem.   Skin: Negative for dry skin and rash.   Neurological: Negative for dizziness, speech difficulty, weakness, light-headedness, headache, memory problem and confusion.   Psychiatric/Behavioral: Positive for decreased concentration, self-injury, sleep disturbance, suicidal ideas, depressed mood and stress. Negative for agitation, behavioral problems, dysphoric mood, hallucinations and negative for hyperactivity. The patient is nervous/anxious.        Physical Exam;;  Physical Exam  Vitals signs and nursing note reviewed.  "  Constitutional:       General: She is not in acute distress.     Appearance: She is well-developed. She is not diaphoretic.   HENT:      Head: Normocephalic and atraumatic.   Eyes:      Conjunctiva/sclera: Conjunctivae normal.   Neck:      Musculoskeletal: Full passive range of motion without pain and normal range of motion.   Cardiovascular:      Rate and Rhythm: Normal rate.   Pulmonary:      Effort: Pulmonary effort is normal. No respiratory distress.   Musculoskeletal: Normal range of motion.   Skin:     General: Skin is warm and dry.   Neurological:      Mental Status: She is alert and oriented to person, place, and time.   Psychiatric:         Mood and Affect: Mood is anxious and depressed. Affect is not labile, blunt, angry or inappropriate.         Speech: Speech is not rapid and pressured or tangential.         Behavior: Behavior normal. Behavior is not agitated, slowed, aggressive, withdrawn, hyperactive or combative. Behavior is cooperative.         Thought Content: Thought content normal. Thought content is not paranoid or delusional. Thought content does not include homicidal or suicidal ideation. Thought content does not include homicidal or suicidal plan.         Judgment: Judgment normal.       Blood pressure 115/72, pulse 65, temperature 97.6 °F (36.4 °C), height 170.2 cm (67.01\"), weight 64.8 kg (142 lb 12.8 oz), SpO2 97 %, not currently breastfeeding.  Body mass index is 22.36 kg/m².    Current Medications;;    Current Outpatient Medications:   •  hydrOXYzine (ATARAX) 10 MG tablet, Take 1 tablet by mouth Every 12 (Twelve) Hours As Needed for Anxiety (sleep)., Disp: 30 tablet, Rfl: 5  •  sertraline (ZOLOFT) 100 MG tablet, Take 1 tablet by mouth Daily., Disp: 30 tablet, Rfl: 4  •  lithium carbonate 300 MG capsule, Take 1 capsule by mouth Daily., Disp: 30 capsule, Rfl: 1  •  minocycline (MINOCIN,DYNACIN) 100 MG capsule, , Disp: , Rfl:     Lab Results:   Admission on 08/28/2020, Discharged on " 08/28/2020   Component Date Value Ref Range Status   • HCG, Urine QL 08/28/2020 Negative  Negative Final       Mental Status Exam:   Hygiene:   good  Cooperation:  Cooperative  Eye Contact:  Good  Psychomotor Behavior:  Appropriate  Mood:anxious, constricted, decreased range, depressed, dysthymic and sad  Affect:  Restricted  Hopelessness: 7  Speech:  Monotone  Thought Process:  Goal directed  Thought Content:  Normal  Suicidal:  Suicidal Ideation  Homicidal:  None  Hallucinations:  None  Delusion:  None  Memory:  Intact  Orientation:  Person, Place, Time and Situation  Reliability:  good  Insight:  Good  Judgement:  Good  Impulse Control:  Good  Physical/Medical Issues:  No     PHQ-9 Depression Screening  Little interest or pleasure in doing things? 3   Feeling down, depressed, or hopeless? 3   Trouble falling or staying asleep, or sleeping too much? 3   Feeling tired or having little energy? 3   Poor appetite or overeating? 0   Feeling bad about yourself - or that you are a failure or have let yourself or your family down? 3   Trouble concentrating on things, such as reading the newspaper or watching television? 3   Moving or speaking so slowly that other people could have noticed? Or the opposite - being so fidgety or restless that you have been moving around a lot more than usual? 3   Thoughts that you would be better off dead, or of hurting yourself in some way? 2   PHQ-9 Total Score 23   If you checked off any problems, how difficult have these problems made it for you to do your work, take care of things at home, or get along with other people?          Assessment/Plan:  Diagnoses and all orders for this visit:    1. Bipolar II disorder (CMS/[facility]) (Primary)  -     lithium carbonate 300 MG capsule; Take 1 capsule by mouth Daily.  Dispense: 30 capsule; Refill: 1    2. Generalized anxiety disorder      Patient continues to have suicidal ideation and cutting.  Patient does not specify a plan or intent at this  time.  Start lithium 300 mg daily off label for suicidal ideation.  Patient aware to call the office or suicide hotline whenever having thoughts.    A psychological evaluation was conducted in order to assess past and current level of functioning. Areas assessed included, but were not limited to: perception of social support, perception of ability to face and deal with challenges in life (positive functioning), anxiety symptoms, depressive symptoms, perspective on beliefs/belief system, coping skills for stress, intelligence level,  Therapeutic rapport was established. Interventions conducted today were geared towards incorporating medication management along with support for continued therapy. Education was also provided as to the med management with this provider and what to expect in subsequent sessions.    We discussed risks, benefits,goals and side effects of the above medication and the patient was agreeable with the plan.Patient was educated on the importance of compliance with treatment and follow-up appointments. Patient is aware to contact the [facility] with any worsening of symptoms. To call for questions or concerns and return early if necessary. Patent is agreeable to go to the Emergency Department or call 911 should they begin SI/HI.     Treatment Plan:   Discussed risks, benefits, and alternatives of medication. Encouraged healthy habits (eating, exercise and sleep). Call if any questions or problems arise. Medication reconciled. Controlled substance monitoring report reviewed. Provided psychoeducation.. Discussed coping strategies and current stressors. Set appropriate boundaries and limits for patient's well-being. Use distraction techniques to improve symptoms. Access support networks.      Return in about 12 days (around 11/17/2020) for Follow Up after 5pm.    RITESH Sherwood

## 2020-11-07 ENCOUNTER — HOSPITAL ENCOUNTER (EMERGENCY)
Facility: HOSPITAL | Age: 20
Discharge: HOME | End: 2020-11-07
Attending: EMERGENCY MEDICINE | Admitting: EMERGENCY MEDICINE

## 2020-11-07 VITALS
TEMPERATURE: 98.6 F | BODY MASS INDEX: 22.16 KG/M2 | RESPIRATION RATE: 16 BRPM | HEIGHT: 67 IN | OXYGEN SATURATION: 100 % | DIASTOLIC BLOOD PRESSURE: 74 MMHG | WEIGHT: 141.2 LBS | SYSTOLIC BLOOD PRESSURE: 112 MMHG | HEART RATE: 67 BPM

## 2020-11-07 DIAGNOSIS — R45.851 SUICIDAL IDEATION: ICD-10-CM

## 2020-11-07 DIAGNOSIS — R45.86 MOOD DISTURBANCE: Primary | ICD-10-CM

## 2020-11-07 LAB
ALBUMIN SERPL-MCNC: 4.7 G/DL (ref 3.5–5.2)
ALBUMIN/GLOB SERPL: 1.6 G/DL
ALP SERPL-CCNC: 75 U/L (ref 39–117)
ALT SERPL W P-5'-P-CCNC: 9 U/L (ref 1–33)
AMPHET+METHAMPHET UR QL: NEGATIVE
AMPHETAMINES UR QL: NEGATIVE
ANION GAP SERPL CALCULATED.3IONS-SCNC: 8 MMOL/L (ref 5–15)
ANION GAP SERPL CALCULATED.3IONS-SCNC: 8 MMOL/L (ref 5–15)
APAP SERPL-MCNC: <5 MCG/ML (ref 0–30)
AST SERPL-CCNC: 18 U/L (ref 1–32)
B-HCG UR QL: NEGATIVE
BARBITURATES UR QL SCN: NEGATIVE
BENZODIAZ UR QL SCN: NEGATIVE
BILIRUB SERPL-MCNC: 0.5 MG/DL (ref 0–1.2)
BILIRUB UR QL STRIP: NEGATIVE
BUN SERPL-MCNC: 11 MG/DL (ref 6–20)
BUN SERPL-MCNC: 11 MG/DL (ref 6–20)
BUN/CREAT SERPL: 13.6 (ref 7–25)
BUN/CREAT SERPL: 13.6 (ref 7–25)
BUPRENORPHINE SERPL-MCNC: NEGATIVE NG/ML
CALCIUM SPEC-SCNC: 9.4 MG/DL (ref 8.6–10.5)
CALCIUM SPEC-SCNC: 9.4 MG/DL (ref 8.6–10.5)
CANNABINOIDS SERPL QL: NEGATIVE
CHLORIDE SERPL-SCNC: 103 MMOL/L (ref 98–107)
CHLORIDE SERPL-SCNC: 103 MMOL/L (ref 98–107)
CLARITY UR: CLEAR
CO2 SERPL-SCNC: 29 MMOL/L (ref 22–29)
CO2 SERPL-SCNC: 29 MMOL/L (ref 22–29)
COCAINE UR QL: NEGATIVE
COLOR UR: ABNORMAL
CREAT SERPL-MCNC: 0.81 MG/DL (ref 0.57–1)
CREAT SERPL-MCNC: 0.81 MG/DL (ref 0.57–1)
ETHANOL BLD-MCNC: <10 MG/DL (ref 0–10)
ETHANOL UR QL: <0.01 %
GFR SERPL CREATININE-BSD FRML MDRD: 88 ML/MIN/1.73
GFR SERPL CREATININE-BSD FRML MDRD: 88 ML/MIN/1.73
GLOBULIN UR ELPH-MCNC: 2.9 GM/DL
GLUCOSE SERPL-MCNC: 90 MG/DL (ref 65–99)
GLUCOSE SERPL-MCNC: 90 MG/DL (ref 65–99)
GLUCOSE UR STRIP-MCNC: NEGATIVE MG/DL
HGB UR QL STRIP.AUTO: NEGATIVE
HOLD SPECIMEN: NORMAL
HOLD SPECIMEN: NORMAL
KETONES UR QL STRIP: ABNORMAL
LEUKOCYTE ESTERASE UR QL STRIP.AUTO: NEGATIVE
METHADONE UR QL SCN: NEGATIVE
NITRITE UR QL STRIP: NEGATIVE
OPIATES UR QL: NEGATIVE
OXYCODONE UR QL SCN: NEGATIVE
PCP UR QL SCN: NEGATIVE
PH UR STRIP.AUTO: 6.5 [PH] (ref 5–8)
POTASSIUM SERPL-SCNC: 3.9 MMOL/L (ref 3.5–5.2)
POTASSIUM SERPL-SCNC: 3.9 MMOL/L (ref 3.5–5.2)
PROPOXYPH UR QL: NEGATIVE
PROT SERPL-MCNC: 7.6 G/DL (ref 6–8.5)
PROT UR QL STRIP: NEGATIVE
SALICYLATES SERPL-MCNC: <0.3 MG/DL
SODIUM SERPL-SCNC: 140 MMOL/L (ref 136–145)
SODIUM SERPL-SCNC: 140 MMOL/L (ref 136–145)
SP GR UR STRIP: >=1.03 (ref 1–1.03)
TRICYCLICS UR QL SCN: NEGATIVE
UROBILINOGEN UR QL STRIP: ABNORMAL
WHOLE BLOOD HOLD SPECIMEN: NORMAL
WHOLE BLOOD HOLD SPECIMEN: NORMAL

## 2020-11-07 PROCEDURE — 81003 URINALYSIS AUTO W/O SCOPE: CPT | Performed by: EMERGENCY MEDICINE

## 2020-11-07 PROCEDURE — 81025 URINE PREGNANCY TEST: CPT | Performed by: EMERGENCY MEDICINE

## 2020-11-07 PROCEDURE — 99283 EMERGENCY DEPT VISIT LOW MDM: CPT

## 2020-11-07 PROCEDURE — 80053 COMPREHEN METABOLIC PANEL: CPT | Performed by: EMERGENCY MEDICINE

## 2020-11-07 PROCEDURE — 80307 DRUG TEST PRSMV CHEM ANLYZR: CPT | Performed by: EMERGENCY MEDICINE

## 2020-11-07 PROCEDURE — 80048 BASIC METABOLIC PNL TOTAL CA: CPT | Performed by: EMERGENCY MEDICINE

## 2020-11-07 NOTE — ED PROVIDER NOTES
"Subjective   20-year-old female presenting with suicidal ideation.  She states that for the last month or so she has had suicidal ideations.  No specific plan.  Has had issues with depression and anxiety.  Her psychiatrist prescribed her lithium recently, she has not got it filled but states \"my plan was to give it 1 more month and then do something\".  Her friends brought her in today because they were concerned about her.  She has been cutting herself.  Never attempted suicide before.  Never been hospitalized for behavioral health reasons.  She denies any other complaints or concerns.  She denies drug or alcohol use.          Review of Systems   Constitutional: Negative.    HENT: Negative.    Eyes: Negative.    Respiratory: Negative.    Cardiovascular: Negative.    Gastrointestinal: Negative.    Genitourinary: Negative.    Musculoskeletal: Negative.    Skin: Negative.    Neurological: Negative.    Psychiatric/Behavioral: Positive for dysphoric mood, self-injury and suicidal ideas.       Past Medical History:   Diagnosis Date   • Anxiety    • Depression    • Dizziness 2015   • Environmental allergies    • Migraine 2019   • Multiple allergies    • Nosebleed    • Ringing in ears    • Seasonal allergies    • Sinus problem    • Snores        Allergies   Allergen Reactions   • Shellfish-Derived Products Anaphylaxis   • Tree Nut Anaphylaxis   • Cat Hair Extract    • Nuts        Past Surgical History:   Procedure Laterality Date   • NO PAST SURGERIES         Family History   Problem Relation Age of Onset   • No Known Problems Mother    • No Known Problems Father    • No Known Problems Sister    • No Known Problems Brother    • No Known Problems Son    • No Known Problems Daughter    • Heart disease Maternal Grandmother    • No Known Problems Maternal Grandfather    • Heart disease Paternal Grandmother    • Arthritis Paternal Grandmother    • No Known Problems Paternal Grandfather    • No Known Problems Cousin    • Lung " cancer Other    • Skin cancer Other    • Diabetes Other    • Heart attack Other    • Hypertension Other    • Hyperlipidemia Other    • Mental illness Other    • Stroke Other    • Rheum arthritis Neg Hx    • Osteoarthritis Neg Hx    • Asthma Neg Hx    • Heart failure Neg Hx    • Migraines Neg Hx    • Rashes / Skin problems Neg Hx    • Seizures Neg Hx    • Thyroid disease Neg Hx    • Colon cancer Neg Hx    • Cirrhosis Neg Hx    • Liver disease Neg Hx    • Liver cancer Neg Hx    • Crohn's disease Neg Hx    • Ulcerative colitis Neg Hx    • Esophageal cancer Neg Hx    • Stomach cancer Neg Hx        Social History     Socioeconomic History   • Marital status: Single     Spouse name: Not on file   • Number of children: Not on file   • Years of education: Not on file   • Highest education level: Not on file   Tobacco Use   • Smoking status: Never Smoker   • Smokeless tobacco: Never Used   Substance and Sexual Activity   • Alcohol use: No   • Drug use: No   • Sexual activity: Defer           Objective   Physical Exam  Constitutional:       General: She is not in acute distress.     Appearance: Normal appearance. She is not ill-appearing, toxic-appearing or diaphoretic.   HENT:      Head: Normocephalic and atraumatic.      Right Ear: External ear normal.      Left Ear: External ear normal.      Nose: Nose normal.      Mouth/Throat:      Mouth: Mucous membranes are moist.      Pharynx: Oropharynx is clear.   Eyes:      Extraocular Movements: Extraocular movements intact.      Conjunctiva/sclera: Conjunctivae normal.      Pupils: Pupils are equal, round, and reactive to light.   Neck:      Musculoskeletal: Normal range of motion.   Cardiovascular:      Rate and Rhythm: Normal rate and regular rhythm.      Pulses: Normal pulses.      Heart sounds: Normal heart sounds.   Pulmonary:      Effort: Pulmonary effort is normal. No respiratory distress.      Breath sounds: Normal breath sounds.   Abdominal:      General: Bowel sounds  are normal. There is no distension.      Tenderness: There is no abdominal tenderness.   Musculoskeletal: Normal range of motion.         General: No swelling, tenderness or deformity.   Skin:     General: Skin is warm and dry.      Capillary Refill: Capillary refill takes less than 2 seconds.      Findings: No rash.      Comments: Numerous linear superficial lacerations to the left volar forearm   Neurological:      General: No focal deficit present.      Mental Status: She is alert and oriented to person, place, and time.   Psychiatric:      Comments: Depressed mood, suicidal ideations         Procedures           ED Course                                           MDM  Number of Diagnoses or Management Options  Mood disturbance:   Suicidal ideation:   Diagnosis management comments: 20-year-old female with suicidal ideations, self-harm.  Well-developed and well-nourished young female no distress with exam as above.  She has normal vital signs.  She does have some self-inflicted abrasion lacerations to the left forearm.  Will obtain clearance labs.  Will consult behavioral health.  Disposition pending.    DDx: Suicidal ideations, depression, anxiety    Patient has been evaluated by behavioral health, they feel she is safe to go home and start her lithium.  I feel that is a reasonable plan.  Patient is happy with this plan.  Will discharge home.       Amount and/or Complexity of Data Reviewed  Clinical lab tests: reviewed        Final diagnoses:   Mood disturbance   Suicidal ideation            Chad Song MD  11/07/20 1311

## 2020-11-07 NOTE — CONSULTS
"Rhona Diaz  2000    TIME: [id number]    Is patient agreeable to admission/treatment? Yes    Guardian: (Must have paperwork) FAMILY MEMBER - GUARDIAN: Self    Guardian Name/Contact/etc: N/A    Pt Lives With: Recently moved into apartment alone    Highest Level of Education: college graduate     Presenting Problems: (How is the patient a danger to self or others?)  Patient presents with multiple psychosocial stressors such as boundary issues with her parents, conflicts with ingrained belief systems, and stress at work.    Current Stressors: family problems, residence change and spiritual distress    Depression: 8     Anxiety: 8    Previous Psychiatric Treatment: Yes    If yes, describe: Patient is currently being treated by psychiatrist Gayla from [facility] and will be seeing a new therapist in the same agency next Thursday.  No history of inpatient admission.    Last inpatient admission: N/A    Number of admissions: N/A    Last outpatient visit: Last week  COLUMBIA-SUICIDE SEVERITY RATING SCALE  Psychiatric Inpatient Setting - Discharge Screener    Ask questions that are bold and underlined Discharge   Ask Questions 1 and 2 YES NO   1) Wish to be Dead:   Person endorses thoughts about a wish to be dead or not alive anymore, or wish to fall asleep and not wake up.  While you were here in the hospital, have you wished you were dead or wished you could go to sleep and not wake up?   \"From time to time    2) Suicidal Thoughts:   General non-specific thoughts of wanting to end one's life/die by suicide, “I've thought about killing myself” without general thoughts of ways to kill oneself/associated methods, intent, or plan.   While you were here in the hospital, have you actually had thoughts about killing yourself?    X    If YES to 2, ask questions 3, 4, 5, and 6.  If NO to 2, go directly to question 6   3) Suicidal Thoughts with Method (without Specific Plan or Intent to Act):   Person endorses thoughts of " suicide and has thought of a least one method during the assessment period. This is different than a specific plan with time, place or method details worked out. “I thought about taking an overdose but I never made a specific plan as to when where or how I would actually do it….and I would never go through with it.”   Have you been thinking about how you might kill yourself?     X   4) Suicidal Intent (without Specific Plan):   Active suicidal thoughts of killing oneself and patient reports having some intent to act on such thoughts, as opposed to “I have the thoughts but I definitely will not do anything about them.”   Have you had these thoughts and had some intention of acting on them or do you have some intention of acting on them after you leave the hospital?     X   5) Suicide Intent with Specific Plan:   Thoughts of killing oneself with details of plan fully or partially worked out and person has some intent to carry it out.   Have you started to work out or worked out the details of how to kill yourself either for while you were here in the hospital or for after you leave the hospital? Do you intend to carry out this plan?     X     6) Suicide Behavior    While you were here in the hospital, have you done anything, started to do anything, or prepared to do anything to end your life?    Examples: Took pills, cut yourself, tried to hang yourself, took out pills but didn't swallow any because you changed your mind or someone took them from you, collected pills, secured a means of obtaining a gun, gave away valuables, wrote a will or suicide note, etc.   X       Suicidal: Patient states she does not want to die but from time to time has death wish    Previous Attempts: no prior suicide attempts    Number of Previous Attempts: N/A    Most Recent Attempt: N/A    Family Hx of Mental Health/Substance Abuse: Patient denies any family history of mental health issues    Delusions: Patient presents with linear and  "rational thought.     Hallucinations: Patient reports from time to time she has \"thought someone walked in her house but they did not.\"    Mood: within normal limits     Homicidal Ideations: Absent     Abuse History: History of physical abuse: no     Does this require reporting: N/A    Legal History / History of Violence: The patient has no significant history of legal issues.     Sleep: Interrupted    Appetite: Fair    Current Medical Conditions: No    If yes, explain:     Current Psychiatric Medications: Patient reports she is currently being treated with Zoloft, Vistaril, and was recently prescribed lithium which she will be picking this prescription up upon discharge of the ED.     History of Inappropriate Sexual Behavior: No    Hopelessness: no    Orientation: alert and oriented to person, place, and time     Substance Abuse: does not use    COWS: N/A    CIWA: /AA    Withdrawal Symptoms: N/A     History of DT's: N/A    History of Seizures: No    SUBSTANCE ABUSE HISTORY: Patient denies any substance use    DRUG   PRESENT USE  Y/N   AGE @ 1ST USE    ROUTE   HOW MUCH   HOW OFTEN   HOW LONG AT THIS RATE   Date of last use/  Amount used   Nicotine            Alcohol            Marijuana            Benzos              Neurontin            Methadone            Opiates              Cocaine             Heroin            Meth            Suboxone              If in active addiction, do living arrangements affect recovery?:      DATA:   This therapist received a call from [facility] staff (DASIA Ray) with orders from (Dr. Duncan MD) for a behavioral health consult.  The patient serves as her own guardian and is agreeable to speak with me.  Met with patient at bedside. Patient is under 1:1 security monitoring during assessment.  Patient is a 20 year old, not , , female residing in [place], [place]. Patient currently lives alone.  Patient is employed as a teacher at [employer]..      Patient presents today with " chief compliant of suicidal ideation.  Patient indicates she has several psychosocial stressors that have weighed heavily on her mind and may likely be part of the basis for this ED visit.  She also indicated her psychiatrist Gayla from [facility] (patient is unable to remember her physician's last name) has been attempting to find the best medication for her symptoms.  After a recent medication adjustment the patient noticed some negative symptoms occluding SI for which she reached out to her psychiatrist.  The doctor gave her a prescription of lithium and asked her to take this medication as soon as possible to help her return to baseline and stabilize her condition.  The patient was unable to obtain her prescription until today but has not picked it up from the pharmacy yet.  Patient reports long history of depression since [employer] along with recent psychosocial stressors including grandfathers death 2 years ago seem to exacerbate her problems.  Patient voices many times throughout assessment that she does not want to harm herself and would not want to harm her family by any such attempt.  She does endorse death wish from time to time when her medication adjustments are not going well.  Patient is future oriented and reports she likes her job very much and is glad she recently moved out from her parents home.  She reports she has a strong support system especially in her friend nelson who brought her to the ED this afternoon.  Her first therapy appointment will be this coming Thursday and she is very motivated for psychotherapy.  She and her physician discussed the wraparound services and how it will aid in all around reduction of symptoms and quality of life.  Patient reports she has self harmed in the past, approximately 4 months ago and recently had a relapse making several superficial cuts on her forearm.    The Patient does not have minor children.     Patient reports to be agreeable for treatment  recommendations.     ASSESSMENT:    Therapist completed CSSRS with patient for suicide risk assessment.  The results of patient’s CSSRS suggest that patient is denying any SI/HI or preparatory behaviors.  Patient holds attention and is Cooperative with assessment.  Patient’s appearance is clean and casually dressed, appropriate.  The patient displays Appropriate psychomotor behavior. The patient's affect appears normal. The patient is observed to have normal rate, tone and rhythm of speech.   Patient observed to have Good eye contact. The patient's displays good insight, with fair impulse control and fair judgement.     PLAN:    At this time, therapist recommends follow-up with outpatient treatment with her psychiatrist and therapist on 11/13/20 . Patient reports to be agreeable to the recommendations.  Therapist informed [facility] ED treatment team members, DASIA Prajapati and Dr. Duncan MD who are agreeable to plan.    I also discussed the availability of emergency behavioral health services 24/7 at [facility].  Assisted patient in identifying risk factors that would indicate the need for higher level of care, such worsening symptoms, thoughts to harm self or others and/or self harming behaviors.  Encourage patient to call 911, crisis hotline, or present to the nearest emergency department should symptoms worsen, or in any crisis/emergency.  Patient agreeable and voiced understanding.      NICOLE Baig, ABRAHAMW 11/7/2020 2174

## 2020-11-17 ENCOUNTER — OFFICE VISIT (OUTPATIENT)
Dept: BEHAVIORAL HEALTH | Facility: CLINIC | Age: 20
End: 2020-11-17

## 2020-11-17 VITALS
DIASTOLIC BLOOD PRESSURE: 62 MMHG | WEIGHT: 141.4 LBS | SYSTOLIC BLOOD PRESSURE: 92 MMHG | BODY MASS INDEX: 22.19 KG/M2 | TEMPERATURE: 97.8 F | HEIGHT: 67 IN | OXYGEN SATURATION: 98 % | HEART RATE: 70 BPM

## 2020-11-17 DIAGNOSIS — F41.9 ANXIETY: Primary | ICD-10-CM

## 2020-11-17 DIAGNOSIS — F31.81 BIPOLAR 2 DISORDER, MAJOR DEPRESSIVE EPISODE (HCC): ICD-10-CM

## 2020-11-17 DIAGNOSIS — F32.0 CURRENT MILD EPISODE OF MAJOR DEPRESSIVE DISORDER WITHOUT PRIOR EPISODE (HCC): ICD-10-CM

## 2020-11-17 PROCEDURE — 90791 PSYCH DIAGNOSTIC EVALUATION: CPT | Performed by: COUNSELOR

## 2020-11-17 RX ORDER — HYDROXYZINE HYDROCHLORIDE 10 MG/1
10 TABLET, FILM COATED ORAL EVERY 12 HOURS PRN
Qty: 60 TABLET | Refills: 1 | Status: SHIPPED | OUTPATIENT
Start: 2020-11-17 | End: 2021-03-23 | Stop reason: SDUPTHER

## 2020-11-17 NOTE — PROGRESS NOTES
Initial Therapy Office Visit      Date: 2020     Patient Name: Rhona Diaz  : 2000   Time In: 4:07   Time Out: 4:51    Chief Complaint:    Chief Complaint   Patient presents with   • Establish Care   • Depression   • Anxiety       History of Present Illness: Rhona Diaz is a 20 y.o. female who presents today for initial evaluation . Met and discussed her the anxiety that she has been feeling in regards to her job, focusing on herself since she moved out of her families home in September. Also she reports that she doesn't want to be at her families [employer] in which her father is a . Recently, Rhona went to [facility] [place] operated by [facility] due to cutting on herself and she spoke with a therapist. Discussed a plan to continue taking the medication for her anxiety.  Rhona also discussed having anxiety before her job at [employer] in [place]. 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    Subjective      Review of Systems:   The following portions of the patient's history were reviewed and updated as appropriate: allergies, current medications, past family history, past medical history, past social history, past surgical history and problem list.    Review of Systems   Constitutional: Negative for activity change, appetite change, unexpected weight gain and unexpected weight loss.   Respiratory: Negative for apnea, chest tightness and shortness of breath.    Cardiovascular: Negative for chest pain and palpitations.   Gastrointestinal: Negative for constipation, diarrhea, nausea and vomiting.   Musculoskeletal: Negative for back pain, gait problem and neck stiffness.   Skin: Negative for color change, rash and bruise.   Neurological: Negative for dizziness, tremors, weakness, light-headedness, numbness, headache, memory problem and confusion.   Psychiatric/Behavioral: Positive for decreased concentration, dysphoric mood, hallucinations,  self-injury, suicidal ideas, positive for hyperactivity, depressed mood and stress. Negative for agitation, behavioral problems and sleep disturbance. The patient is nervous/anxious.        Past Medical History:   Past Medical History:   Diagnosis Date   • Anxiety    • Depression    • Dizziness 2015   • Environmental allergies    • Migraine 2019   • Multiple allergies    • Nosebleed    • Ringing in ears    • Seasonal allergies    • Sinus problem    • Snores        Past Surgical History:   Past Surgical History:   Procedure Laterality Date   • NO PAST SURGERIES         Family History:   Family History   Problem Relation Age of Onset   • No Known Problems Mother    • No Known Problems Father    • No Known Problems Sister    • No Known Problems Brother    • No Known Problems Son    • No Known Problems Daughter    • Heart disease Maternal Grandmother    • No Known Problems Maternal Grandfather    • Heart disease Paternal Grandmother    • Arthritis Paternal Grandmother    • No Known Problems Paternal Grandfather    • No Known Problems Cousin    • Lung cancer Other    • Skin cancer Other    • Diabetes Other    • Heart attack Other    • Hypertension Other    • Hyperlipidemia Other    • Mental illness Other    • Stroke Other    • Rheum arthritis Neg Hx    • Osteoarthritis Neg Hx    • Asthma Neg Hx    • Heart failure Neg Hx    • Migraines Neg Hx    • Rashes / Skin problems Neg Hx    • Seizures Neg Hx    • Thyroid disease Neg Hx    • Colon cancer Neg Hx    • Cirrhosis Neg Hx    • Liver disease Neg Hx    • Liver cancer Neg Hx    • Crohn's disease Neg Hx    • Ulcerative colitis Neg Hx    • Esophageal cancer Neg Hx    • Stomach cancer Neg Hx        Social History:   Social History     Socioeconomic History   • Marital status: Single     Spouse name: Not on file   • Number of children: Not on file   • Years of education: Not on file   • Highest education level: Not on file   Tobacco Use   • Smoking status: Never Smoker   •  "Smokeless tobacco: Never Used   Substance and Sexual Activity   • Alcohol use: No   • Drug use: No   • Sexual activity: Defer       Medications:     Current Outpatient Medications:   •  hydrOXYzine (ATARAX) 10 MG tablet, Take 1 tablet by mouth Every 12 (Twelve) Hours As Needed for Anxiety (sleep)., Disp: 60 tablet, Rfl: 1  •  lithium carbonate 300 MG capsule, Take 1 capsule by mouth Daily., Disp: 30 capsule, Rfl: 1  •  minocycline (MINOCIN,DYNACIN) 100 MG capsule, , Disp: , Rfl:   •  sertraline (ZOLOFT) 100 MG tablet, Take 1 tablet by mouth Daily., Disp: 30 tablet, Rfl: 4    Allergies:   Allergies   Allergen Reactions   • Shellfish-Derived Products Anaphylaxis   • Tree Nut Anaphylaxis   • Cat Hair Extract    • Nuts        PHQ-9 Score:   PHQ-9 Total Score: 22     Objective     Physical Exam:   Blood pressure 92/62, pulse 70, temperature 97.8 °F (36.6 °C), height 170.2 cm (67\"), weight 64.1 kg (141 lb 6.4 oz), SpO2 98 %, not currently breastfeeding. Body mass index is 22.15 kg/m².     Physical Exam  Vitals signs and nursing note reviewed.   Constitutional:       General: She is awake.      Appearance: Normal appearance. She is well-developed, well-groomed and normal weight.      Interventions: Face mask in place.      Comments: Face mask due to Covid   Pulmonary:      Breath sounds: Normal breath sounds.   Musculoskeletal: Normal range of motion.   Skin:     Findings: No acne, bruising, lesion or rash.   Neurological:      General: No focal deficit present.      Mental Status: She is alert and oriented to person, place, and time.      Motor: No weakness or tremor.      Gait: Gait is intact.   Psychiatric:         Attention and Perception: Attention normal. She is attentive. She does not perceive auditory or visual hallucinations.         Mood and Affect: Affect normal. Mood is anxious and depressed.         Speech: Speech normal.         Behavior: Behavior normal. Behavior is cooperative.         Thought Content: " Thought content includes suicidal ideation. Thought content does not include suicidal plan.         Cognition and Memory: Cognition and memory normal.         Judgment: Judgment normal.         Patient's Support Network Includes:  parents, and sister    Prognosis: Good with Ongoing Treatment     Mental Status Exam:   Hygiene:   good  Cooperation:  Cooperative  Eye Contact:  Good  Psychomotor Behavior:  Appropriate  Affect:  Appropriate  Mood: normal  Hopelessness: Denies  Speech:  Normal  Thought Process:  Goal directed  Thought Content:  Normal  Suicidal:  Suicidal Ideation  Homicidal:  None  Hallucinations:  None and Not demonstrated today  Delusion:  None  Memory:  Intact  Orientation:  Person, Place and Time  Reliability:  good  Insight:  Good  Judgement:  Good  Impulse Control:  Good  Physical/Medical Issues:  No      Assessment / Plan      Assessment/Plan:   Diagnoses and all orders for this visit:    1. Anxiety (Primary)    2. Bipolar 2 disorder, major depressive episode (CMS/HCC)    3. Current mild episode of major depressive disorder without prior episode (CMS/HCC)         1. We will work on some coping skills the next visit, and work on ways to communicate her needs to her parents.     TREATMENT PLAN/GOALS: Continue supportive psychotherapy efforts and medications as indicated. Treatment and medication options discussed during today's visit. Patient ackowledged and verbally consented to continue with current treatment plan and was educated on the importance of compliance with treatment and follow-up appointments.     Counseled patient regarding multimodal approach with healthy nutrition, healthy sleep, regular physical activity, social activities, counseling, and medications.      Coping skills reviewed and encouraged positive framing of thoughts. No suicidal ideation or homicidal ideation at this time.      Assisted patient in processing above session content; acknowledged and normalized patient’s  thoughts, feelings, and concerns.  Applied  positive coping skills and behavior management in session.    Allowed patient to freely discuss issues without interruption or judgment. Provided safe, confidential environment to facilitate the development of positive therapeutic relationship and encourage open, honest communication. Assisted patient in identifying risk factors which would indicate the need for higher level of care including thoughts to harm self or others and/or self-harming behavior and encouraged patient to contact this office, call 911, or present to the nearest emergency room should any of these events occur. Discussed crisis intervention services and means to access.     Follow Up:   Return in about 2 weeks (around 12/1/2020) for Recheck.    DAVID Wells  This document has been electronically signed by DAVID Wells  November 17, 2020 17:14 EST    Please note that portions of this note were completed with a voice recognition program. Efforts were made to edit dictation, but occasionally words are mistranscribed.

## 2020-11-23 ENCOUNTER — OFFICE VISIT (OUTPATIENT)
Dept: BEHAVIORAL HEALTH | Facility: CLINIC | Age: 20
End: 2020-11-23

## 2020-11-23 VITALS
HEART RATE: 78 BPM | WEIGHT: 143.4 LBS | OXYGEN SATURATION: 98 % | SYSTOLIC BLOOD PRESSURE: 116 MMHG | HEIGHT: 67 IN | DIASTOLIC BLOOD PRESSURE: 70 MMHG | TEMPERATURE: 97.5 F | BODY MASS INDEX: 22.51 KG/M2

## 2020-11-23 DIAGNOSIS — F41.9 ANXIETY: Primary | ICD-10-CM

## 2020-11-23 DIAGNOSIS — F31.81 BIPOLAR II DISORDER (HCC): ICD-10-CM

## 2020-11-23 DIAGNOSIS — F31.81 BIPOLAR 2 DISORDER, MAJOR DEPRESSIVE EPISODE (HCC): ICD-10-CM

## 2020-11-23 PROCEDURE — 99213 OFFICE O/P EST LOW 20 MIN: CPT | Performed by: NURSE PRACTITIONER

## 2020-11-23 RX ORDER — SERTRALINE HYDROCHLORIDE 100 MG/1
100 TABLET, FILM COATED ORAL DAILY
Qty: 30 TABLET | Refills: 4 | Status: SHIPPED | OUTPATIENT
Start: 2020-11-23 | End: 2021-04-06 | Stop reason: SDUPTHER

## 2020-11-23 RX ORDER — LITHIUM CARBONATE 300 MG/1
300 CAPSULE ORAL DAILY
Qty: 30 CAPSULE | Refills: 1 | Status: SHIPPED | OUTPATIENT
Start: 2020-11-23 | End: 2021-03-09 | Stop reason: SDUPTHER

## 2020-11-23 NOTE — PROGRESS NOTES
Patient Name: Rhona Diaz  MRN: [id number]   :  2000     Chief Complaint:      ICD-10-CM ICD-9-CM   1. Anxiety  F41.9 300.00   2. Bipolar 2 disorder, major depressive episode (CMS/[facility])  F31.81 296.89   3. Bipolar II disorder (CMS/[facility])  F31.81 296.89       History of Present Illness: Rhona Diaz is a 20 y.o. female is here today for medication management follow up.  Patient states she notices improvement with lithium.  States prior to lithium patient was constantly ruminating over the thought that people would be better off without her.  Patient states since starting lithium she does not have that thought very often whereas before it was 24 hours a day.  Patient states she is very glad she started therapy and thinks it is going to be a very good match.    The following portions of the patient's history were reviewed and updated as appropriate: allergies, current medications, past family history, past medical history, past social history, past surgical history and problem list.    Review of Systems;;  Review of Systems   Constitutional: Negative for activity change, appetite change, fatigue, unexpected weight gain and unexpected weight loss.   Respiratory: Negative for shortness of breath and wheezing.    Gastrointestinal: Negative for constipation, diarrhea, nausea and vomiting.   Musculoskeletal: Negative for gait problem.   Skin: Negative for dry skin and rash.   Neurological: Negative for dizziness, speech difficulty, weakness, light-headedness, headache, memory problem and confusion.   Psychiatric/Behavioral: Positive for depressed mood. Negative for agitation, behavioral problems, decreased concentration, dysphoric mood, hallucinations, self-injury, sleep disturbance, suicidal ideas, negative for hyperactivity and stress. The patient is nervous/anxious.        Physical Exam;;  Physical Exam  Vitals signs and nursing note reviewed.   Constitutional:       General: She is not in acute distress.      "Appearance: She is well-developed. She is not diaphoretic.   HENT:      Head: Normocephalic and atraumatic.   Eyes:      Conjunctiva/sclera: Conjunctivae normal.   Neck:      Musculoskeletal: Full passive range of motion without pain and normal range of motion.   Cardiovascular:      Rate and Rhythm: Normal rate.   Pulmonary:      Effort: Pulmonary effort is normal. No respiratory distress.   Musculoskeletal: Normal range of motion.   Skin:     General: Skin is warm and dry.   Neurological:      Mental Status: She is alert and oriented to person, place, and time.   Psychiatric:         Mood and Affect: Mood is anxious and depressed. Affect is not labile, blunt, angry or inappropriate.         Speech: Speech is not rapid and pressured or tangential.         Behavior: Behavior normal. Behavior is not agitated, slowed, aggressive, withdrawn, hyperactive or combative. Behavior is cooperative.         Thought Content: Thought content normal. Thought content is not paranoid or delusional. Thought content does not include homicidal or suicidal ideation. Thought content does not include homicidal or suicidal plan.         Judgment: Judgment normal.       Blood pressure 116/70, pulse 78, temperature 97.5 °F (36.4 °C), height 170.2 cm (67\"), weight 65 kg (143 lb 6.4 oz), SpO2 98 %, not currently breastfeeding.  Body mass index is 22.46 kg/m².    Current Medications;;    Current Outpatient Medications:   •  hydrOXYzine (ATARAX) 10 MG tablet, Take 1 tablet by mouth Every 12 (Twelve) Hours As Needed for Anxiety (sleep)., Disp: 60 tablet, Rfl: 1  •  lithium carbonate 300 MG capsule, Take 1 capsule by mouth Daily., Disp: 30 capsule, Rfl: 1  •  minocycline (MINOCIN,DYNACIN) 100 MG capsule, , Disp: , Rfl:   •  sertraline (ZOLOFT) 100 MG tablet, Take 1 tablet by mouth Daily., Disp: 30 tablet, Rfl: 4    Lab Results:   Admission on 11/07/2020, Discharged on 11/07/2020   Component Date Value Ref Range Status   • Ethanol 11/07/2020 <10  " 0 - 10 mg/dL Final   • Ethanol % 11/07/2020 <0.010  % Final   • THC, Screen, Urine 11/07/2020 Negative  Negative Final   • Phencyclidine (PCP), Urine 11/07/2020 Negative  Negative Final   • Cocaine Screen, Urine 11/07/2020 Negative  Negative Final   • Methamphetamine, Ur 11/07/2020 Negative  Negative Final   • Opiate Screen 11/07/2020 Negative  Negative Final   • Amphetamine Screen, Urine 11/07/2020 Negative  Negative Final   • Benzodiazepine Screen, Urine 11/07/2020 Negative  Negative Final   • Tricyclic Antidepressants Screen 11/07/2020 Negative  Negative Final   • Methadone Screen, Urine 11/07/2020 Negative  Negative Final   • Barbiturates Screen, Urine 11/07/2020 Negative  Negative Final   • Oxycodone Screen, Urine 11/07/2020 Negative  Negative Final   • Propoxyphene Screen 11/07/2020 Negative  Negative Final   • Buprenorphine, Screen, Urine 11/07/2020 Negative  Negative Final   • Extra Tube 11/07/2020 hold for add-on   Final    Auto resulted   • Extra Tube 11/07/2020 Hold for add-ons.   Final    Auto resulted.   • Extra Tube 11/07/2020 hold for add-on   Final    Auto resulted   • Extra Tube 11/07/2020 Hold for add-ons.   Final    Auto resulted.   • Glucose 11/07/2020 90  65 - 99 mg/dL Final   • BUN 11/07/2020 11  6 - 20 mg/dL Final   • Creatinine 11/07/2020 0.81  0.57 - 1.00 mg/dL Final   • Sodium 11/07/2020 140  136 - 145 mmol/L Final   • Potassium 11/07/2020 3.9  3.5 - 5.2 mmol/L Final   • Chloride 11/07/2020 103  98 - 107 mmol/L Final   • CO2 11/07/2020 29.0  22.0 - 29.0 mmol/L Final   • Calcium 11/07/2020 9.4  8.6 - 10.5 mg/dL Final   • Total Protein 11/07/2020 7.6  6.0 - 8.5 g/dL Final   • Albumin 11/07/2020 4.70  3.50 - 5.20 g/dL Final   • ALT (SGPT) 11/07/2020 9  1 - 33 U/L Final   • AST (SGOT) 11/07/2020 18  1 - 32 U/L Final   • Alkaline Phosphatase 11/07/2020 75  39 - 117 U/L Final   • Total Bilirubin 11/07/2020 0.5  0.0 - 1.2 mg/dL Final   • eGFR Non African Amer 11/07/2020 88  >60 mL/min/1.73 Final    • Globulin 11/07/2020 2.9  gm/dL Final   • A/G Ratio 11/07/2020 1.6  g/dL Final   • BUN/Creatinine Ratio 11/07/2020 13.6  7.0 - 25.0 Final   • Anion Gap 11/07/2020 8.0  5.0 - 15.0 mmol/L Final   • Glucose 11/07/2020 90  65 - 99 mg/dL Final   • BUN 11/07/2020 11  6 - 20 mg/dL Final   • Creatinine 11/07/2020 0.81  0.57 - 1.00 mg/dL Final   • Sodium 11/07/2020 140  136 - 145 mmol/L Final   • Potassium 11/07/2020 3.9  3.5 - 5.2 mmol/L Final   • Chloride 11/07/2020 103  98 - 107 mmol/L Final   • CO2 11/07/2020 29.0  22.0 - 29.0 mmol/L Final   • Calcium 11/07/2020 9.4  8.6 - 10.5 mg/dL Final   • eGFR Non African Amer 11/07/2020 88  >60 mL/min/1.73 Final   • BUN/Creatinine Ratio 11/07/2020 13.6  7.0 - 25.0 Final   • Anion Gap 11/07/2020 8.0  5.0 - 15.0 mmol/L Final   • HCG, Urine QL 11/07/2020 Negative  Negative Final   • Color, UA 11/07/2020 Dark Yellow* Yellow, Straw Final   • Appearance, UA 11/07/2020 Clear  Clear Final   • pH, UA 11/07/2020 6.5  5.0 - 8.0 Final   • Specific Gravity, UA 11/07/2020 >=1.030  1.005 - 1.030 Final   • Glucose, UA 11/07/2020 Negative  Negative Final   • Ketones, UA 11/07/2020 Trace* Negative Final   • Bilirubin, UA 11/07/2020 Negative  Negative Final   • Blood, UA 11/07/2020 Negative  Negative Final   • Protein, UA 11/07/2020 Negative  Negative Final   • Leuk Esterase, UA 11/07/2020 Negative  Negative Final   • Nitrite, UA 11/07/2020 Negative  Negative Final   • Urobilinogen, UA 11/07/2020 0.2 E.U./dL  0.2 - 1.0 E.U./dL Final   • Acetaminophen 11/07/2020 <5.0  0.0 - 30.0 mcg/mL Final   • Salicylate 11/07/2020 <0.3  <=30.0 mg/dL Final   Admission on 08/28/2020, Discharged on 08/28/2020   Component Date Value Ref Range Status   • HCG, Urine QL 08/28/2020 Negative  Negative Final       Mental Status Exam:   Hygiene:   good  Cooperation:  Cooperative  Eye Contact:  Good  Psychomotor Behavior:  Appropriate  Mood:anxious and depressed  Affect:  Appropriate  Hopelessness: Denies  Speech:   Normal  Thought Process:  Goal directed  Thought Content:  Normal  Suicidal:  None  Homicidal:  None  Hallucinations:  None  Delusion:  None  Memory:  Intact  Orientation:  Person, Place, Time and Situation  Reliability:  good  Insight:  Good  Judgement:  Good  Impulse Control:  Good  Physical/Medical Issues:  No     PHQ-9 Depression Screening  Little interest or pleasure in doing things? 3   Feeling down, depressed, or hopeless? 3   Trouble falling or staying asleep, or sleeping too much? 2   Feeling tired or having little energy? 2   Poor appetite or overeating? 0   Feeling bad about yourself - or that you are a failure or have let yourself or your family down? 3   Trouble concentrating on things, such as reading the newspaper or watching television? 3   Moving or speaking so slowly that other people could have noticed? Or the opposite - being so fidgety or restless that you have been moving around a lot more than usual? 3   Thoughts that you would be better off dead, or of hurting yourself in some way? 1   PHQ-9 Total Score 20   If you checked off any problems, how difficult have these problems made it for you to do your work, take care of things at home, or get along with other people?          Assessment/Plan:  Diagnoses and all orders for this visit:    1. Anxiety (Primary)  -     sertraline (ZOLOFT) 100 MG tablet; Take 1 tablet by mouth Daily.  Dispense: 30 tablet; Refill: 4    2. Bipolar 2 disorder, major depressive episode (CMS/HCC)  -     lithium carbonate 300 MG capsule; Take 1 capsule by mouth Daily.  Dispense: 30 capsule; Refill: 1  -     sertraline (ZOLOFT) 100 MG tablet; Take 1 tablet by mouth Daily.  Dispense: 30 tablet; Refill: 4    3. Bipolar II disorder (CMS/HCC)      Continue medications as ordered.    A psychological evaluation was conducted in order to assess past and current level of functioning. Areas assessed included, but were not limited to: perception of social support, perception of  ability to face and deal with challenges in life (positive functioning), anxiety symptoms, depressive symptoms, perspective on beliefs/belief system, coping skills for stress, intelligence level,  Therapeutic rapport was established. Interventions conducted today were geared towards incorporating medication management along with support for continued therapy. Education was also provided as to the med management with this provider and what to expect in subsequent sessions.    We discussed risks, benefits,goals and side effects of the above medication and the patient was agreeable with the plan.Patient was educated on the importance of compliance with treatment and follow-up appointments. Patient is aware to contact the [facility] with any worsening of symptoms. To call for questions or concerns and return early if necessary. Patent is agreeable to go to the Emergency Department or call 911 should they begin SI/HI.     Treatment Plan:   Discussed risks, benefits, and alternatives of medication. Encouraged healthy habits (eating, exercise and sleep). Call if any questions or problems arise. Medication reconciled. Controlled substance monitoring report reviewed. Provided psychoeducation.. Discussed coping strategies and current stressors. Set appropriate boundaries and limits for patient's well-being. Use distraction techniques to improve symptoms. Access support networks.      Return in about 4 weeks (around 12/21/2020) for Follow Up 15 min.    RITESH Sherwood

## 2020-12-03 ENCOUNTER — OFFICE VISIT (OUTPATIENT)
Dept: BEHAVIORAL HEALTH | Facility: CLINIC | Age: 20
End: 2020-12-03

## 2020-12-03 VITALS
TEMPERATURE: 97.9 F | DIASTOLIC BLOOD PRESSURE: 72 MMHG | OXYGEN SATURATION: 97 % | HEIGHT: 67 IN | BODY MASS INDEX: 22.47 KG/M2 | WEIGHT: 143.2 LBS | SYSTOLIC BLOOD PRESSURE: 116 MMHG | HEART RATE: 71 BPM

## 2020-12-03 DIAGNOSIS — F41.9 ANXIETY: Primary | ICD-10-CM

## 2020-12-03 PROCEDURE — 90834 PSYTX W PT 45 MINUTES: CPT | Performed by: COUNSELOR

## 2020-12-03 NOTE — PROGRESS NOTES
Follow Up Therapy Office Visit      Date: 2020     Patient Name: Rhona Diaz  : 2000   Time In: 4:10  Time Out: 4:51    Chief Complaint:    Chief Complaint   Patient presents with   • Depression   • Anxiety       History of Present Illness: Rhona Diaz is a 20 y.o. female who presents today for follow up. Rhona discussed that she had a good Thanksgiving. Patient had breakfast with her parents and her sister on , and Friday she had Thanksgiving leftovers for lunch. Patient discussed that her whole family was getting together on  (20+), and she didn't want to be a part of a large gathering. Through her journaling, the patient has discovered more about her anxieties. 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 Discussed how some of her anxiety going to her parents has to do with her desire (but is afraid) to tell her father she wants to try going to another [employer]. 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 She reports to having a lot of nightmares and bad dreams and realizes it's because of the stress she is feeling at that time.       Subjective      Review of Systems:   The following portions of the patient's history were reviewed and updated as appropriate: allergies, current medications, past family history, past medical history, past social history, past surgical history and problem list.    Review of Systems   Respiratory: Positive for shortness of breath.    Cardiovascular: Positive for palpitations.   Skin: Negative for color change, rash and bruise.   Psychiatric/Behavioral: Positive for decreased concentration and stress. The patient is nervous/anxious.         Medications:     Current Outpatient Medications:   •  hydrOXYzine (ATARAX) 10 MG tablet, Take 1 tablet by mouth Every 12  "(Twelve) Hours As Needed for Anxiety (sleep)., Disp: 60 tablet, Rfl: 1  •  lithium carbonate 300 MG capsule, Take 1 capsule by mouth Daily., Disp: 30 capsule, Rfl: 1  •  minocycline (MINOCIN,DYNACIN) 100 MG capsule, , Disp: , Rfl:   •  sertraline (ZOLOFT) 100 MG tablet, Take 1 tablet by mouth Daily., Disp: 30 tablet, Rfl: 4    PHQ-9 Score:   PHQ-9 Total Score:       Objective     Physical Exam:   Blood pressure 116/72, pulse 71, temperature 97.9 °F (36.6 °C), height 170.2 cm (67\"), weight 65 kg (143 lb 3.2 oz), SpO2 97 %, not currently breastfeeding. Body mass index is 22.43 kg/m².     Physical Exam  Vitals signs and nursing note reviewed.   Constitutional:       General: She is awake.      Appearance: Normal appearance. She is well-developed, well-groomed and normal weight.      Interventions: Face mask in place.      Comments: Face mask due to Covid   Musculoskeletal: Normal range of motion.   Skin:     Findings: No acne.   Neurological:      General: No focal deficit present.      Mental Status: She is alert and oriented to person, place, and time.      Motor: No tremor.      Gait: Gait is intact.   Psychiatric:         Attention and Perception: Attention normal. She is attentive. She does not perceive auditory or visual hallucinations.         Mood and Affect: Mood and affect normal.         Speech: Speech normal.         Behavior: Behavior normal. Behavior is cooperative.         Thought Content: Thought content normal.         Cognition and Memory: Cognition and memory normal.         Judgment: Judgment normal.         Patient's Support Network Includes:  friends, sister, and parents    Prognosis: Good with Ongoing Treatment     Mental Status Exam:   Hygiene:   good  Cooperation:  Cooperative  Eye Contact:  Good  Psychomotor Behavior:  Appropriate  Affect:  Appropriate  Mood: normal  Hopelessness: Denies  Speech:  Normal  Thought Process:  Goal directed  Thought Content:  Normal  Suicidal:  None  Homicidal:  " None  Hallucinations:  None  Delusion:  None  Memory:  Intact  Orientation:  Person, Place, Time and Situation  Reliability:  good  Insight:  Good  Judgement:  Good  Impulse Control:  Good  Physical/Medical Issues:  No      Assessment / Plan      Assessment/Plan:   Diagnoses and all orders for this visit:    1. Anxiety (Primary)         1. In follow up sessions, we will work on increasing Rhona's self esteem, and relaxation techniques.     TREATMENT PLAN/GOALS: Continue supportive psychotherapy efforts and medications as indicated. Treatment and medication options discussed during today's visit. Patient ackowledged and verbally consented to continue with current treatment plan and was educated on the importance of compliance with treatment and follow-up appointments.     Counseled patient regarding multimodal approach with healthy nutrition, healthy sleep, regular physical activity, social activities, counseling, and medications.      Coping skills reviewed and encouraged positive framing of thoughts. No suicidal ideation or homicidal ideation at this time.      Assisted patient in processing above session content; acknowledged and normalized patient’s thoughts, feelings, and concerns.  Applied  positive coping skills and behavior management in session.    Allowed patient to freely discuss issues without interruption or judgment. Provided safe, confidential environment to facilitate the development of positive therapeutic relationship and encourage open, honest communication. Assisted patient in identifying risk factors which would indicate the need for higher level of care including thoughts to harm self or others and/or self-harming behavior and encouraged patient to contact this office, call 911, or present to the nearest emergency room should any of these events occur. Discussed crisis intervention services and means to access.     Follow Up:   Return in about 2 weeks (around 12/17/2020) for Recheck.    Susan  DAVID Figueroa  This document has been electronically signed by DAVID Wells  December 3, 2020 17:18 EST    Please note that portions of this note were completed with a voice recognition program. Efforts were made to edit dictation, but occasionally words are mistranscribed.

## 2020-12-22 ENCOUNTER — OFFICE VISIT (OUTPATIENT)
Dept: BEHAVIORAL HEALTH | Facility: CLINIC | Age: 20
End: 2020-12-22

## 2020-12-22 VITALS
DIASTOLIC BLOOD PRESSURE: 74 MMHG | HEART RATE: 85 BPM | OXYGEN SATURATION: 98 % | TEMPERATURE: 97.4 F | WEIGHT: 143 LBS | BODY MASS INDEX: 22.44 KG/M2 | SYSTOLIC BLOOD PRESSURE: 110 MMHG | HEIGHT: 67 IN

## 2020-12-22 DIAGNOSIS — F41.9 ANXIETY: Primary | ICD-10-CM

## 2020-12-22 DIAGNOSIS — F31.81 BIPOLAR 2 DISORDER, MAJOR DEPRESSIVE EPISODE (HCC): ICD-10-CM

## 2020-12-22 PROCEDURE — 90834 PSYTX W PT 45 MINUTES: CPT | Performed by: COUNSELOR

## 2020-12-22 NOTE — PROGRESS NOTES
Follow Up Therapy Office Visit      Date: 2020     Patient Name: Rhona Diaz  : 2000   Time In: 9:55  Time Out: 10:48    Chief Complaint:    Chief Complaint   Patient presents with   • Anxiety     follow up   • Depression       History of Present Illness: Rhona Diaz is a 20 y.o. female who presents today for follow up. Met with Rhona at the [place] office today. Rhona discussed that things have been going better, and has spoken to her mother regarding leaving the [employer]. Mother was upset, but suggested that the patient wait on telling her father. Rhona has decided that after the Holiday's would be better to tell him. 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 Patient discussed that her boss was very receptive and discussed that he understood her decision. Rhona discussed that she now has less stress on her regarding the Yazidi issue, and teaching. Patient also discussed some other concerns regarding her parents that she has.     Subjective      Review of Systems:   The following portions of the patient's history were reviewed and updated as appropriate: allergies, current medications, past family history, past medical history, past social history, past surgical history and problem list.    Review of Systems   Skin: Negative for color change, rash and bruise.   Psychiatric/Behavioral: Positive for stress. The patient is nervous/anxious.         Medications:     Current Outpatient Medications:   •  hydrOXYzine (ATARAX) 10 MG tablet, Take 1 tablet by mouth Every 12 (Twelve) Hours As Needed for Anxiety (sleep)., Disp: 60 tablet, Rfl: 1  •  lithium carbonate 300 MG capsule, Take 1 capsule by mouth Daily., Disp: 30 capsule, Rfl: 1  •  minocycline (MINOCIN,DYNACIN) 100 MG capsule, , Disp: , Rfl:   •  sertraline (ZOLOFT) 100 MG tablet, Take 1 tablet by mouth Daily., Disp: 30 tablet, Rfl: 4    PHQ-9 Score:   PHQ-9 Total Score: 17  "    Objective     Physical Exam:   Blood pressure 110/74, pulse 85, temperature 97.4 °F (36.3 °C), height 170.2 cm (67\"), weight 64.9 kg (143 lb), SpO2 98 %, not currently breastfeeding. Body mass index is 22.4 kg/m².     Physical Exam  Vitals signs and nursing note reviewed.   Constitutional:       General: She is awake.      Appearance: Normal appearance. She is well-developed, well-groomed and normal weight.      Interventions: Face mask in place.      Comments: Face mask due to Covid   Musculoskeletal: Normal range of motion.   Skin:     Findings: No acne.   Neurological:      General: No focal deficit present.      Mental Status: She is alert and oriented to person, place, and time.      Motor: No tremor.      Gait: Gait is intact.   Psychiatric:         Attention and Perception: Attention normal. She is attentive. She does not perceive auditory or visual hallucinations.         Mood and Affect: Mood and affect normal.         Speech: Speech normal.         Behavior: Behavior normal. Behavior is cooperative.         Thought Content: Thought content normal.         Cognition and Memory: Cognition and memory normal.         Judgment: Judgment normal.         Patient's Support Network Includes:  parents and sister, friends    Prognosis: Good with Ongoing Treatment     Mental Status Exam:   Hygiene:   good  Cooperation:  Cooperative  Eye Contact:  Good  Psychomotor Behavior:  Appropriate  Affect:  Appropriate  Mood: normal  Hopelessness: Denies  Speech:  Normal  Thought Process:  Goal directed  Thought Content:  Normal  Suicidal:  None  Homicidal:  None  Hallucinations:  None  Delusion:  None  Memory:  Intact  Orientation:  Person, Place, Time and Situation  Reliability:  good  Insight:  Good  Judgement:  Good  Impulse Control:  Good  Physical/Medical Issues:  No      Assessment / Plan      Assessment/Plan:   Diagnoses and all orders for this visit:    1. Anxiety (Primary)    2. Bipolar 2 disorder, major depressive " episode (CMS/HCC)         1. Continue to work with the patient on her anxiety and communication regarding her needs and desires regarding her future.    TREATMENT PLAN/GOALS: Continue supportive psychotherapy efforts and medications as indicated. Treatment and medication options discussed during today's visit. Patient ackowledged and verbally consented to continue with current treatment plan and was educated on the importance of compliance with treatment and follow-up appointments.     Counseled patient regarding multimodal approach with healthy nutrition, healthy sleep, regular physical activity, social activities, counseling, and medications.      Coping skills reviewed and encouraged positive framing of thoughts. No suicidal ideation or homicidal ideation at this time.      Assisted patient in processing above session content; acknowledged and normalized patient’s thoughts, feelings, and concerns.  Applied  positive coping skills and behavior management in session.    Allowed patient to freely discuss issues without interruption or judgment. Provided safe, confidential environment to facilitate the development of positive therapeutic relationship and encourage open, honest communication. Assisted patient in identifying risk factors which would indicate the need for higher level of care including thoughts to harm self or others and/or self-harming behavior and encouraged patient to contact this office, call 911, or present to the nearest emergency room should any of these events occur. Discussed crisis intervention services and means to access.     Follow Up:   Return in about 2 weeks (around 1/5/2021).    DAVID Wells  This document has been electronically signed by DAVID Wells  December 22, 2020 13:06 EST    Please note that portions of this note were completed with a voice recognition program. Efforts were made to edit dictation, but occasionally words are mistranscribed.

## 2021-01-14 ENCOUNTER — OFFICE VISIT (OUTPATIENT)
Dept: BEHAVIORAL HEALTH | Facility: CLINIC | Age: 21
End: 2021-01-14

## 2021-01-14 VITALS
SYSTOLIC BLOOD PRESSURE: 112 MMHG | OXYGEN SATURATION: 98 % | HEIGHT: 67 IN | WEIGHT: 145 LBS | HEART RATE: 79 BPM | BODY MASS INDEX: 22.76 KG/M2 | TEMPERATURE: 97.4 F | DIASTOLIC BLOOD PRESSURE: 70 MMHG

## 2021-01-14 DIAGNOSIS — Z63.8 OTHER FAMILY DISRUPTION: ICD-10-CM

## 2021-01-14 DIAGNOSIS — F31.9 BIPOLAR 1 DISORDER (HCC): ICD-10-CM

## 2021-01-14 DIAGNOSIS — F41.9 ANXIETY: Primary | ICD-10-CM

## 2021-01-14 PROCEDURE — 90834 PSYTX W PT 45 MINUTES: CPT | Performed by: COUNSELOR

## 2021-01-14 SDOH — SOCIAL STABILITY - SOCIAL INSECURITY: OTHER SPECIFIED PROBLEMS RELATED TO PRIMARY SUPPORT GROUP: Z63.8

## 2021-01-18 NOTE — PROGRESS NOTES
"     Follow Up Therapy Office Visit      Date: 2021     Patient Name: Rhona Diaz  : 2000   Time In: 514  Time Out: 601    Chief Complaint:    Chief Complaint   Patient presents with   • Anxiety     follow up therapy session        History of Present Illness: Rhona Diaz is a 20 y.o. female who presents today for follow up. Met with Rhona today at the [place] office. Rhona discussed that after [place], she did tell her father (the  of her Rastafari) that she wanted to experience another Rastafari. Patient discussed that her father did not take the discussion very well and he and mother blamed her \"toxic\" friends, going to therapy, and being on medication for her change in wanting to experience other things. The patient discussed that she feels better that she has talked to him about this, but he has made her feel guilty about leaving the Rastafari. Discussed options for going to another [employer] while giving notice for her responsibilities. 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 Discussed pursuing her dream of doing art and going to an art school in [place].     Subjective      Review of Systems:   The following portions of the patient's history were reviewed and updated as appropriate: allergies, current medications, past family history, past medical history, past social history, past surgical history and problem list.    Review of Systems   Skin: Negative for color change, rash and bruise.   Psychiatric/Behavioral: Positive for stress. The patient is nervous/anxious.         Medications:     Current Outpatient Medications:   •  hydrOXYzine (ATARAX) 10 MG tablet, Take 1 tablet by mouth Every 12 (Twelve) Hours As Needed for Anxiety (sleep)., Disp: 60 tablet, Rfl: 1  •  lithium carbonate 300 MG capsule, Take 1 capsule by mouth Daily., Disp: 30 capsule, Rfl: 1  •  sertraline (ZOLOFT) 100 MG " "tablet, Take 1 tablet by mouth Daily., Disp: 30 tablet, Rfl: 4  •  minocycline (MINOCIN,DYNACIN) 100 MG capsule, , Disp: , Rfl:     PHQ-9 Score:   PHQ-9 Total Score:       Objective     Physical Exam:   Blood pressure 112/70, pulse 79, temperature 97.4 °F (36.3 °C), height 170.2 cm (67\"), weight 65.8 kg (145 lb), SpO2 98 %, not currently breastfeeding. Body mass index is 22.71 kg/m².     Physical Exam  Vitals signs and nursing note reviewed.   Constitutional:       General: She is awake.      Appearance: Normal appearance. She is well-developed, well-groomed and normal weight.      Interventions: Face mask in place.      Comments: Face mask due to Covid   Musculoskeletal: Normal range of motion.   Skin:     Findings: No acne.   Neurological:      General: No focal deficit present.      Mental Status: She is alert and oriented to person, place, and time.      Motor: No tremor.      Gait: Gait is intact.   Psychiatric:         Attention and Perception: Attention normal. She is attentive. She does not perceive auditory or visual hallucinations.         Mood and Affect: Mood and affect normal.         Speech: Speech normal.         Behavior: Behavior normal. Behavior is cooperative.         Thought Content: Thought content normal.         Cognition and Memory: Cognition and memory normal.         Judgment: Judgment normal.         Patient's Support Network Includes:  friends, and sister    Prognosis: Good with Ongoing Treatment     Mental Status Exam:   Hygiene:   good  Cooperation:  Cooperative  Eye Contact:  Good  Psychomotor Behavior:  Appropriate  Affect:  Appropriate  Mood: normal  Hopelessness: Denies  Speech:  Normal  Thought Process:  Goal directed  Thought Content:  Normal  Suicidal:  None  Homicidal:  None  Hallucinations:  None  Delusion:  None  Memory:  Intact  Orientation:  Person, Place, Time and Situation  Reliability:  good  Insight:  Good  Judgement:  Good  Impulse Control:  Good  Physical/Medical " Issues:  No      Assessment / Plan      Assessment/Plan:   Diagnoses and all orders for this visit:    1. Anxiety (Primary)    2. Bipolar 1 disorder (CMS/HCC)    3. Other family disruption         1. Encouraged the patient to alternate the weeks going to the new [employer]; this will allow her current [employer] to find other people to do Bible Study, and play the drums. Discussed her relationship with her parents and how she could make that better in the future.     TREATMENT PLAN/GOALS: Continue supportive psychotherapy efforts and medications as indicated. Treatment and medication options discussed during today's visit. Patient ackowledged and verbally consented to continue with current treatment plan and was educated on the importance of compliance with treatment and follow-up appointments.     Counseled patient regarding multimodal approach with healthy nutrition, healthy sleep, regular physical activity, social activities, counseling, and medications.      Coping skills reviewed and encouraged positive framing of thoughts. No suicidal ideation or homicidal ideation at this time.      Assisted patient in processing above session content; acknowledged and normalized patient’s thoughts, feelings, and concerns.  Applied  positive coping skills and behavior management in session.    Allowed patient to freely discuss issues without interruption or judgment. Provided safe, confidential environment to facilitate the development of positive therapeutic relationship and encourage open, honest communication. Assisted patient in identifying risk factors which would indicate the need for higher level of care including thoughts to harm self or others and/or self-harming behavior and encouraged patient to contact this office, call 911, or present to the nearest emergency room should any of these events occur. Discussed crisis intervention services and means to access.     Follow Up:   Return in about 2 weeks (around 1/28/2021) for  [place].    DAVID Wells  This document has been electronically signed by DAVID Wells  January 18, 2021 11:24 EST    Please note that portions of this note were completed with a voice recognition program. Efforts were made to edit dictation, but occasionally words are mistranscribed.

## 2021-03-09 DIAGNOSIS — F31.81 BIPOLAR 2 DISORDER, MAJOR DEPRESSIVE EPISODE (HCC): ICD-10-CM

## 2021-03-09 RX ORDER — LITHIUM CARBONATE 300 MG/1
300 CAPSULE ORAL DAILY
Qty: 30 CAPSULE | Refills: 1 | Status: SHIPPED | OUTPATIENT
Start: 2021-03-09 | End: 2021-03-16 | Stop reason: SDUPTHER

## 2021-03-16 ENCOUNTER — OFFICE VISIT (OUTPATIENT)
Dept: BEHAVIORAL HEALTH | Facility: CLINIC | Age: 21
End: 2021-03-16

## 2021-03-16 VITALS
OXYGEN SATURATION: 98 % | HEART RATE: 75 BPM | BODY MASS INDEX: 22.6 KG/M2 | WEIGHT: 144 LBS | TEMPERATURE: 94.1 F | DIASTOLIC BLOOD PRESSURE: 70 MMHG | SYSTOLIC BLOOD PRESSURE: 112 MMHG | HEIGHT: 67 IN

## 2021-03-16 DIAGNOSIS — F31.81 BIPOLAR 2 DISORDER (HCC): Primary | ICD-10-CM

## 2021-03-16 DIAGNOSIS — F41.9 ANXIETY: ICD-10-CM

## 2021-03-16 DIAGNOSIS — F31.81 BIPOLAR 2 DISORDER, MAJOR DEPRESSIVE EPISODE (HCC): ICD-10-CM

## 2021-03-16 PROCEDURE — 90832 PSYTX W PT 30 MINUTES: CPT | Performed by: COUNSELOR

## 2021-03-16 RX ORDER — LITHIUM CARBONATE 300 MG/1
300 CAPSULE ORAL DAILY
Qty: 30 CAPSULE | Refills: 1 | Status: SHIPPED | OUTPATIENT
Start: 2021-03-16 | End: 2021-04-06 | Stop reason: SDUPTHER

## 2021-03-16 NOTE — TELEPHONE ENCOUNTER
Patient was seen in office with Susan Figueroa today and stated she has not been taking medication for over a week. Patient had requested and had medication approved but she has transferred pharmacies and [facility] will not send prescription to [facility].       Please review and sign pended medication; pharmacy updated.

## 2021-03-18 NOTE — PROGRESS NOTES
Follow Up Therapy Office Visit      Date: 2021     Patient Name: Rhona Diaz  : 2000   Time In: 5:17  Time Out: 5:50    Chief Complaint:    Chief Complaint   Patient presents with   • Anxiety       History of Present Illness: Rhona Diaz is a 20 y.o. female who presents today for follow up. Met with the patient at the [place] office today. 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 Discussed ways for her get on the students level and not punish them, but reward them for their positive behaviors. Rhona states that she continues to have mood swings, but feels that she continues in her manic state and is not sleeping well. She has been having a lot of nightmares at night that has caused her not to sleep well. Things are going better for her at home with her parents, and she decided that she is going to [facility] to [facility] [employer] that [facility] since she was a child.    Subjective      Review of Systems:   The following portions of the patient's history were reviewed and updated as appropriate: allergies, current medications, past family history, past medical history, past social history, past surgical history and problem list.    Review of Systems   Psychiatric/Behavioral: Positive for decreased concentration and sleep disturbance. The patient is nervous/anxious.         Medications:     Current Outpatient Medications:   •  hydrOXYzine (ATARAX) 10 MG tablet, Take 1 tablet by mouth Every 12 (Twelve) Hours As Needed for Anxiety (sleep)., Disp: 60 tablet, Rfl: 1  •  sertraline (ZOLOFT) 100 MG tablet, Take 1 tablet by mouth Daily., Disp: 30 tablet, Rfl: 4  •  lithium carbonate 300 MG capsule, Take 1 capsule by mouth Daily., Disp: 30 capsule, Rfl: 1  •  minocycline (MINOCIN,DYNACIN) 100 MG capsule, , Disp: , Rfl:     PHQ-9 Score:   PHQ-9 Total Score:       Objective     Physical Exam:   Blood pressure 112/70, pulse 75,  "temperature 94.1 °F (34.5 °C), height 170.2 cm (67\"), weight 65.3 kg (144 lb), SpO2 98 %, not currently breastfeeding. Body mass index is 22.55 kg/m².     Physical Exam  Vitals and nursing note reviewed.   Constitutional:       General: She is awake.      Appearance: Normal appearance. She is well-developed, well-groomed and normal weight.      Interventions: Face mask in place.      Comments: Face mask due to Covid   Musculoskeletal:         General: Normal range of motion.   Skin:     Findings: No acne.   Neurological:      General: No focal deficit present.      Mental Status: She is alert and oriented to person, place, and time.      Motor: No tremor.      Gait: Gait is intact.   Psychiatric:         Attention and Perception: Attention normal. She is attentive. She does not perceive auditory or visual hallucinations.         Mood and Affect: Mood and affect normal.         Speech: Speech normal.         Behavior: Behavior normal. Behavior is cooperative.         Thought Content: Thought content normal.         Cognition and Memory: Cognition and memory normal.         Judgment: Judgment normal.         Patient's Support Network Includes:  parents and sister    Prognosis: Good with Ongoing Treatment     Mental Status Exam:   Hygiene:   good  Cooperation:  Cooperative  Eye Contact:  Good  Psychomotor Behavior:  Appropriate  Affect:  Appropriate  Mood: normal  Hopelessness: Denies  Speech:  Normal  Thought Process:  Goal directed  Thought Content:  Normal  Suicidal:  None  Homicidal:  None  Hallucinations:  None  Delusion:  None  Memory:  Intact  Orientation:  Person, Place, Time and Situation  Reliability:  good  Insight:  Good  Judgement:  Good  Impulse Control:  Good  Physical/Medical Issues:  No      Assessment / Plan      Assessment/Plan:   Diagnoses and all orders for this visit:    1. Bipolar 2 disorder (CMS/[facility]) (Primary)    2. Anxiety         1. 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      TREATMENT PLAN/GOALS: Continue supportive psychotherapy efforts and medications as indicated. Treatment and medication options discussed during today's visit. Patient ackowledged and verbally consented to continue with current treatment plan and was educated on the importance of compliance with treatment and follow-up appointments.     Counseled patient regarding multimodal approach with healthy nutrition, healthy sleep, regular physical activity, social activities, counseling, and medications.      Coping skills reviewed and encouraged positive framing of thoughts. No suicidal ideation or homicidal ideation at this time.      Assisted patient in processing above session content; acknowledged and normalized patient’s thoughts, feelings, and concerns.  Applied  positive coping skills and behavior management in session.    Allowed patient to freely discuss issues without interruption or judgment. Provided safe, confidential environment to facilitate the development of positive therapeutic relationship and encourage open, honest communication. Assisted patient in identifying risk factors which would indicate the need for higher level of care including thoughts to harm self or others and/or self-harming behavior and encouraged patient to contact this office, call 911, or present to the nearest emergency room should any of these events occur. Discussed crisis intervention services and means to access.     Follow Up:   Return in about 4 weeks (around 4/13/2021) for Recheck.    DAVID Wells  This document has been electronically signed by DAVID Wells  March 18, 2021 09:46 EDT    Please note that portions of this note were completed with a voice recognition program. Efforts were made to edit dictation, but occasionally words are mistranscribed.

## 2021-03-23 RX ORDER — HYDROXYZINE HYDROCHLORIDE 10 MG/1
10 TABLET, FILM COATED ORAL EVERY 12 HOURS PRN
Qty: 60 TABLET | Refills: 1 | Status: SHIPPED | OUTPATIENT
Start: 2021-03-23 | End: 2021-07-06

## 2021-04-06 ENCOUNTER — OFFICE VISIT (OUTPATIENT)
Dept: BEHAVIORAL HEALTH | Facility: CLINIC | Age: 21
End: 2021-04-06

## 2021-04-06 VITALS
SYSTOLIC BLOOD PRESSURE: 118 MMHG | DIASTOLIC BLOOD PRESSURE: 70 MMHG | OXYGEN SATURATION: 99 % | HEIGHT: 67 IN | HEART RATE: 75 BPM | TEMPERATURE: 97.2 F | WEIGHT: 146.2 LBS | BODY MASS INDEX: 22.95 KG/M2

## 2021-04-06 DIAGNOSIS — F31.81 BIPOLAR 2 DISORDER (HCC): Primary | ICD-10-CM

## 2021-04-06 DIAGNOSIS — F31.81 BIPOLAR 2 DISORDER, MAJOR DEPRESSIVE EPISODE (HCC): ICD-10-CM

## 2021-04-06 DIAGNOSIS — F41.9 ANXIETY: ICD-10-CM

## 2021-04-06 PROCEDURE — 99213 OFFICE O/P EST LOW 20 MIN: CPT | Performed by: NURSE PRACTITIONER

## 2021-04-06 RX ORDER — LITHIUM CARBONATE 300 MG/1
300 CAPSULE ORAL DAILY
Qty: 30 CAPSULE | Refills: 6 | Status: SHIPPED | OUTPATIENT
Start: 2021-04-06 | End: 2021-07-06

## 2021-04-06 RX ORDER — SERTRALINE HYDROCHLORIDE 100 MG/1
100 TABLET, FILM COATED ORAL DAILY
Qty: 30 TABLET | Refills: 6 | Status: SHIPPED | OUTPATIENT
Start: 2021-04-06 | End: 2021-07-06

## 2021-04-06 NOTE — PROGRESS NOTES
Patient Name: Rhona Diaz  MRN: [id number]   :  2000     Chief Complaint:      ICD-10-CM ICD-9-CM   1. Bipolar 2 disorder (CMS/[facility])  F31.81 296.89   2. Anxiety  F41.9 300.00   3. Bipolar 2 disorder, major depressive episode (CMS/[facility])  F31.81 296.89       History of Present Illness: Rhona Diaz is a 20 y.o. female is here today for medication management follow up.  Patient states she is doing well.  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  No concerns at this appointment.    The following portions of the patient's history were reviewed and updated as appropriate: allergies, current medications, past family history, past medical history, past social history, past surgical history and problem list.    Review of Systems;;  Review of Systems   Constitutional: Negative for activity change, appetite change, fatigue, unexpected weight gain and unexpected weight loss.   Respiratory: Negative for shortness of breath and wheezing.    Gastrointestinal: Negative for constipation, diarrhea, nausea and vomiting.   Musculoskeletal: Negative for gait problem.   Skin: Negative for dry skin and rash.   Neurological: Negative for dizziness, speech difficulty, weakness, light-headedness, headache, memory problem and confusion.   Psychiatric/Behavioral: Negative for agitation, behavioral problems, decreased concentration, dysphoric mood, hallucinations, self-injury, sleep disturbance, suicidal ideas, negative for hyperactivity, depressed mood and stress. The patient is not nervous/anxious.        Physical Exam;;  Physical Exam  Vitals and nursing note reviewed.   Constitutional:       General: She is not in acute distress.     Appearance: She is well-developed. She is not diaphoretic.   HENT:      Head: Normocephalic and atraumatic.   Eyes:      Conjunctiva/sclera: Conjunctivae normal.   Cardiovascular:      Rate and Rhythm: Normal rate.   Pulmonary:      Effort: Pulmonary effort is normal. No respiratory distress.  "  Musculoskeletal:         General: Normal range of motion.      Cervical back: Full passive range of motion without pain and normal range of motion.   Skin:     General: Skin is warm and dry.   Neurological:      Mental Status: She is alert and oriented to person, place, and time.   Psychiatric:         Mood and Affect: Mood is not anxious or depressed. Affect is not labile, blunt, angry or inappropriate.         Speech: Speech is not rapid and pressured or tangential.         Behavior: Behavior normal. Behavior is not agitated, slowed, aggressive, withdrawn, hyperactive or combative. Behavior is cooperative.         Thought Content: Thought content normal. Thought content is not paranoid or delusional. Thought content does not include homicidal or suicidal ideation. Thought content does not include homicidal or suicidal plan.         Judgment: Judgment normal.       Blood pressure 118/70, pulse 75, temperature 97.2 °F (36.2 °C), height 170.2 cm (67\"), weight 66.3 kg (146 lb 3.2 oz), SpO2 99 %, not currently breastfeeding.  Body mass index is 22.9 kg/m².    Current Medications;;    Current Outpatient Medications:   •  hydrOXYzine (ATARAX) 10 MG tablet, Take 1 tablet by mouth Every 12 (Twelve) Hours As Needed for Anxiety (sleep)., Disp: 60 tablet, Rfl: 1  •  lithium carbonate 300 MG capsule, Take 1 capsule by mouth Daily., Disp: 30 capsule, Rfl: 6  •  sertraline (ZOLOFT) 100 MG tablet, Take 1 tablet by mouth Daily., Disp: 30 tablet, Rfl: 6    Lab Results:   No visits with results within 3 Month(s) from this visit.   Latest known visit with results is:   Admission on 11/07/2020, Discharged on 11/07/2020   Component Date Value Ref Range Status   • Ethanol 11/07/2020 <10  0 - 10 mg/dL Final   • Ethanol % 11/07/2020 <0.010  % Final   • THC, Screen, Urine 11/07/2020 Negative  Negative Final   • Phencyclidine (PCP), Urine 11/07/2020 Negative  Negative Final   • Cocaine Screen, Urine 11/07/2020 Negative  Negative Final   • " Methamphetamine, Ur 11/07/2020 Negative  Negative Final   • Opiate Screen 11/07/2020 Negative  Negative Final   • Amphetamine Screen, Urine 11/07/2020 Negative  Negative Final   • Benzodiazepine Screen, Urine 11/07/2020 Negative  Negative Final   • Tricyclic Antidepressants Screen 11/07/2020 Negative  Negative Final   • Methadone Screen, Urine 11/07/2020 Negative  Negative Final   • Barbiturates Screen, Urine 11/07/2020 Negative  Negative Final   • Oxycodone Screen, Urine 11/07/2020 Negative  Negative Final   • Propoxyphene Screen 11/07/2020 Negative  Negative Final   • Buprenorphine, Screen, Urine 11/07/2020 Negative  Negative Final   • Extra Tube 11/07/2020 hold for add-on   Final    Auto resulted   • Extra Tube 11/07/2020 Hold for add-ons.   Final    Auto resulted.   • Extra Tube 11/07/2020 hold for add-on   Final    Auto resulted   • Extra Tube 11/07/2020 Hold for add-ons.   Final    Auto resulted.   • Glucose 11/07/2020 90  65 - 99 mg/dL Final   • BUN 11/07/2020 11  6 - 20 mg/dL Final   • Creatinine 11/07/2020 0.81  0.57 - 1.00 mg/dL Final   • Sodium 11/07/2020 140  136 - 145 mmol/L Final   • Potassium 11/07/2020 3.9  3.5 - 5.2 mmol/L Final   • Chloride 11/07/2020 103  98 - 107 mmol/L Final   • CO2 11/07/2020 29.0  22.0 - 29.0 mmol/L Final   • Calcium 11/07/2020 9.4  8.6 - 10.5 mg/dL Final   • Total Protein 11/07/2020 7.6  6.0 - 8.5 g/dL Final   • Albumin 11/07/2020 4.70  3.50 - 5.20 g/dL Final   • ALT (SGPT) 11/07/2020 9  1 - 33 U/L Final   • AST (SGOT) 11/07/2020 18  1 - 32 U/L Final   • Alkaline Phosphatase 11/07/2020 75  39 - 117 U/L Final   • Total Bilirubin 11/07/2020 0.5  0.0 - 1.2 mg/dL Final   • eGFR Non African Amer 11/07/2020 88  >60 mL/min/1.73 Final   • Globulin 11/07/2020 2.9  gm/dL Final   • A/G Ratio 11/07/2020 1.6  g/dL Final   • BUN/Creatinine Ratio 11/07/2020 13.6  7.0 - 25.0 Final   • Anion Gap 11/07/2020 8.0  5.0 - 15.0 mmol/L Final   • Glucose 11/07/2020 90  65 - 99 mg/dL Final   • BUN  11/07/2020 11  6 - 20 mg/dL Final   • Creatinine 11/07/2020 0.81  0.57 - 1.00 mg/dL Final   • Sodium 11/07/2020 140  136 - 145 mmol/L Final   • Potassium 11/07/2020 3.9  3.5 - 5.2 mmol/L Final   • Chloride 11/07/2020 103  98 - 107 mmol/L Final   • CO2 11/07/2020 29.0  22.0 - 29.0 mmol/L Final   • Calcium 11/07/2020 9.4  8.6 - 10.5 mg/dL Final   • eGFR Non African Amer 11/07/2020 88  >60 mL/min/1.73 Final   • BUN/Creatinine Ratio 11/07/2020 13.6  7.0 - 25.0 Final   • Anion Gap 11/07/2020 8.0  5.0 - 15.0 mmol/L Final   • HCG, Urine QL 11/07/2020 Negative  Negative Final   • Color, UA 11/07/2020 Dark Yellow* Yellow, Straw Final   • Appearance, UA 11/07/2020 Clear  Clear Final   • pH, UA 11/07/2020 6.5  5.0 - 8.0 Final   • Specific Gravity, UA 11/07/2020 >=1.030  1.005 - 1.030 Final   • Glucose, UA 11/07/2020 Negative  Negative Final   • Ketones, UA 11/07/2020 Trace* Negative Final   • Bilirubin, UA 11/07/2020 Negative  Negative Final   • Blood, UA 11/07/2020 Negative  Negative Final   • Protein, UA 11/07/2020 Negative  Negative Final   • Leuk Esterase, UA 11/07/2020 Negative  Negative Final   • Nitrite, UA 11/07/2020 Negative  Negative Final   • Urobilinogen, UA 11/07/2020 0.2 E.U./dL  0.2 - 1.0 E.U./dL Final   • Acetaminophen 11/07/2020 <5.0  0.0 - 30.0 mcg/mL Final   • Salicylate 11/07/2020 <0.3  <=30.0 mg/dL Final       Mental Status Exam:   Hygiene:   good  Cooperation:  Cooperative  Eye Contact:  Good  Psychomotor Behavior:  Appropriate  Mood:within normal limits  Affect:  Appropriate  Hopelessness: Denies  Speech:  Normal  Thought Process:  Goal directed  Thought Content:  Normal  Suicidal:  None  Homicidal:  None  Hallucinations:  None  Delusion:  None  Memory:  Intact  Orientation:  Person, Place, Time and Situation  Reliability:  good  Insight:  Good  Judgement:  Good  Impulse Control:  Good  Physical/Medical Issues:  No     PHQ-9 Depression Screening  Little interest or pleasure in doing things? 2   Feeling  down, depressed, or hopeless? 0   Trouble falling or staying asleep, or sleeping too much? 3   Feeling tired or having little energy? 3   Poor appetite or overeating? 0   Feeling bad about yourself - or that you are a failure or have let yourself or your family down? 1   Trouble concentrating on things, such as reading the newspaper or watching television? 3   Moving or speaking so slowly that other people could have noticed? Or the opposite - being so fidgety or restless that you have been moving around a lot more than usual? 1   Thoughts that you would be better off dead, or of hurting yourself in some way? 1   PHQ-9 Total Score 14   If you checked off any problems, how difficult have these problems made it for you to do your work, take care of things at home, or get along with other people?          Assessment/Plan:  Diagnoses and all orders for this visit:    1. Bipolar 2 disorder (CMS/HCC) (Primary)    2. Anxiety  -     sertraline (ZOLOFT) 100 MG tablet; Take 1 tablet by mouth Daily.  Dispense: 30 tablet; Refill: 6    3. Bipolar 2 disorder, major depressive episode (CMS/HCC)  -     sertraline (ZOLOFT) 100 MG tablet; Take 1 tablet by mouth Daily.  Dispense: 30 tablet; Refill: 6  -     lithium carbonate 300 MG capsule; Take 1 capsule by mouth Daily.  Dispense: 30 capsule; Refill: 6      Continue medication as ordered.    A psychological evaluation was conducted in order to assess past and current level of functioning. Areas assessed included, but were not limited to: perception of social support, perception of ability to face and deal with challenges in life (positive functioning), anxiety symptoms, depressive symptoms, perspective on beliefs/belief system, coping skills for stress, intelligence level,  Therapeutic rapport was established. Interventions conducted today were geared towards incorporating medication management along with support for continued therapy. Education was also provided as to the med  management with this provider and what to expect in subsequent sessions.    We discussed risks, benefits,goals and side effects of the above medication and the patient was agreeable with the plan.Patient was educated on the importance of compliance with treatment and follow-up appointments. Patient is aware to contact the [facility] with any worsening of symptoms. To call for questions or concerns and return early if necessary. Patent is agreeable to go to the Emergency Department or call 911 should they begin SI/HI.     Treatment Plan:   Discussed risks, benefits, and alternatives of medication. Encouraged healthy habits (eating, exercise and sleep). Call if any questions or problems arise. Medication reconciled. Controlled substance monitoring report reviewed. Provided psychoeducation.. Discussed coping strategies and current stressors. Set appropriate boundaries and limits for patient's well-being. Use distraction techniques to improve symptoms. Access support networks.      Return in about 3 months (around 7/6/2021) for Follow Up 15 min.    Deepali Esposito, RITESH

## 2021-04-29 ENCOUNTER — OFFICE VISIT (OUTPATIENT)
Dept: BEHAVIORAL HEALTH | Facility: CLINIC | Age: 21
End: 2021-04-29

## 2021-04-29 VITALS
DIASTOLIC BLOOD PRESSURE: 72 MMHG | WEIGHT: 151.8 LBS | HEIGHT: 67 IN | SYSTOLIC BLOOD PRESSURE: 118 MMHG | BODY MASS INDEX: 23.83 KG/M2

## 2021-04-29 DIAGNOSIS — F31.81 BIPOLAR 2 DISORDER (HCC): ICD-10-CM

## 2021-04-29 DIAGNOSIS — F41.1 GENERALIZED ANXIETY DISORDER: Primary | ICD-10-CM

## 2021-04-29 PROCEDURE — 90834 PSYTX W PT 45 MINUTES: CPT | Performed by: COUNSELOR

## 2021-05-03 NOTE — PROGRESS NOTES
Follow Up Therapy Office Visit      Date: 2021     Patient Name: Rhona Diaz  : 2000   Time In: 530  Time Out: 610    PCP: Mary Bowman MD    Chief Complaint:     ICD-10-CM ICD-9-CM   1. Generalized anxiety disorder  F41.1 300.02   2. Bipolar 2 disorder (CMS/[facility])  F31.81 296.89       History of Present Illness: Rhona Diaz is a 20 y.o. female who presents today for follow up therapy session. Patient arrived for session on time, clean and casually dressed without evidence of intoxication, withdrawal, or perceptual disturbance. Patient was cooperative and agreeable to treatment and interacted with therapist.  Therapist met with the patient at the [place] location today.  The patient is that things been going okay and that things at school are winding down, and she is ready for the year to be done.  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  The patient states that she has not been feeling anxiety on a normal basis, but the last time she did feel anxiety was this past  when she just got back from her parents house.  Patient became anxious when she was criticized by her parents in regards to her son to school teachings.  Discussed how her parents still have a hold on her regarding her beliefs in [employer].  The patient discussed that she has not self harmed in about 2 to 3 months.     Subjective      Review of Systems:   The following portions of the patient's history were reviewed and updated as appropriate: allergies, current medications, past family history, past medical history, past social history, past surgical history and problem list.    Family History:   Family History   Problem Relation Age of Onset   • No Known Problems Mother    • No Known Problems Father    • No Known Problems Sister    • No Known Problems Brother    • No Known Problems Son    • No Known Problems Daughter    • Heart disease Maternal Grandmother    • No Known Problems Maternal  Grandfather    • Heart disease Paternal Grandmother    • Arthritis Paternal Grandmother    • No Known Problems Paternal Grandfather    • No Known Problems Cousin    • Lung cancer Other    • Skin cancer Other    • Diabetes Other    • Heart attack Other    • Hypertension Other    • Hyperlipidemia Other    • Mental illness Other    • Stroke Other    • Rheum arthritis Neg Hx    • Osteoarthritis Neg Hx    • Asthma Neg Hx    • Heart failure Neg Hx    • Migraines Neg Hx    • Rashes / Skin problems Neg Hx    • Seizures Neg Hx    • Thyroid disease Neg Hx    • Colon cancer Neg Hx    • Cirrhosis Neg Hx    • Liver disease Neg Hx    • Liver cancer Neg Hx    • Crohn's disease Neg Hx    • Ulcerative colitis Neg Hx    • Esophageal cancer Neg Hx    • Stomach cancer Neg Hx        Social History:   Social History     Socioeconomic History   • Marital status: Single     Spouse name: Not on file   • Number of children: Not on file   • Years of education: Not on file   • Highest education level: Not on file   Tobacco Use   • Smoking status: Never Smoker   • Smokeless tobacco: Never Used   Substance and Sexual Activity   • Alcohol use: No   • Drug use: No   • Sexual activity: Defer       Trauma History: No    Spiritual: Attends [employer] on a regular basis, and is a Sunday .     Mental Illness and/or Substance Abuse: Patient has been diagnosed with anxiety and bipolar 2 disorder.     History: No    Medication:     Current Outpatient Medications:   •  hydrOXYzine (ATARAX) 10 MG tablet, Take 1 tablet by mouth Every 12 (Twelve) Hours As Needed for Anxiety (sleep)., Disp: 60 tablet, Rfl: 1  •  lithium carbonate 300 MG capsule, Take 1 capsule by mouth Daily., Disp: 30 capsule, Rfl: 6  •  sertraline (ZOLOFT) 100 MG tablet, Take 1 tablet by mouth Daily., Disp: 30 tablet, Rfl: 6    Allergies:   Allergies   Allergen Reactions   • Cat Hair Extract Hives     Itching, hives, rash    • Nuts Anaphylaxis   • Shellfish-Derived Products  "Anaphylaxis   • Tree Nut Anaphylaxis       Educational/Work History:  Highest level of education obtained: The patient earned a bachelors in [employer] design.  Employment Status: Patient is a teacher at [employer]    Significant Life Events:   Patient been through or witnessed a divorce? no  None    Patient experienced a death / loss of relationship? no  None    Patient experienced a major accident or tragic events? no  None    Patient experienced any other significant life events or trauma (such as verbal, physical, sexual abuse)? no  None    Legal History:  The patient has no significant history of legal issues.  Court-ordered: No    PHQ-9 Score:   PHQ-9 Total Score:       HAYDER 7: Total Score: Unknown    Objective     Physical Exam:   Blood pressure 118/72, height 170.2 cm (67\"), weight 68.9 kg (151 lb 12.8 oz), not currently breastfeeding. Body mass index is 23.78 kg/m².     Patient's Support Network Includes:  parents    Prognosis: Guarded with Ongoing Treatment    Mental Status Exam:   Hygiene:   good  Cooperation:  Cooperative  Eye Contact:  Good  Psychomotor Behavior:  Appropriate  Affect:  Full range  Mood: normal  Hopelessness: Denies  Speech:  Normal  Thought Process:  Goal directed  Thought Content:  Normal  Suicidal:  None  Homicidal:  None  Hallucinations:  None  Delusion:  None  Memory:  Intact  Orientation:  Person, Place, Time and Situation  Reliability:  good  Insight:  Good  Judgement:  Good  Impulse Control:  Good  Physical/Medical Issues:  No      Assessment / Plan      Assessment/Plan:   Diagnoses and all orders for this visit:    1. Generalized anxiety disorder (Primary)    2. Bipolar 2 disorder (CMS/HCC)         1. Therapist will work with the patient on assisting her with her communication skills to help her find effective strategies to address her parents, and the identified goal of her anxiety and reduce his symptoms.    TREATMENT PLAN/GOALS: Continue supportive psychotherapy efforts " and medications as indicated. Treatment and medication options discussed during today's visit. Patient ackowledged and verbally consented to continue with current treatment plan and was educated on the importance of compliance with treatment and follow-up appointments.     Counseled patient regarding multimodal approach with healthy nutrition, healthy sleep, regular physical activity, social activities, counseling, and medications.      Coping skills reviewed and encouraged positive framing of thoughts. No suicidal ideation or homicidal ideation at this time.      Assisted patient in processing above session content; acknowledged and normalized patient’s thoughts, feelings, and concerns.  Applied  positive coping skills and behavior management in session.    Allowed patient to freely discuss issues without interruption or judgment. Provided safe, confidential environment to facilitate the development of positive therapeutic relationship and encourage open, honest communication. Assisted patient in identifying risk factors which would indicate the need for higher level of care including thoughts to harm self or others and/or self-harming behavior and encouraged patient to contact this office, call 911, or present to the nearest emergency room should any of these events occur. Discussed crisis intervention services and means to access.     Follow Up:   Return in about 2 weeks (around 5/13/2021) for Recheck.    DAVID Wells  This document has been electronically signed by DAVID Wells  May 3, 2021 12:00 EDT    Please note that portions of this note were completed with a voice recognition program. Efforts were made to edit dictation, but occasionally words are mistranscribed.

## 2021-07-06 ENCOUNTER — OFFICE VISIT (OUTPATIENT)
Dept: BEHAVIORAL HEALTH | Facility: CLINIC | Age: 21
End: 2021-07-06

## 2021-07-06 VITALS
SYSTOLIC BLOOD PRESSURE: 118 MMHG | BODY MASS INDEX: 25.55 KG/M2 | WEIGHT: 162.8 LBS | HEIGHT: 67 IN | DIASTOLIC BLOOD PRESSURE: 62 MMHG

## 2021-07-06 DIAGNOSIS — F41.1 GENERALIZED ANXIETY DISORDER: Primary | ICD-10-CM

## 2021-07-06 DIAGNOSIS — F31.81 BIPOLAR 2 DISORDER (HCC): ICD-10-CM

## 2021-07-06 PROCEDURE — 99213 OFFICE O/P EST LOW 20 MIN: CPT | Performed by: NURSE PRACTITIONER

## 2021-07-06 RX ORDER — LITHIUM CARBONATE 450 MG
450 TABLET, EXTENDED RELEASE ORAL NIGHTLY
Qty: 30 TABLET | Refills: 1 | Status: SHIPPED | OUTPATIENT
Start: 2021-07-06 | End: 2021-09-07

## 2021-07-06 RX ORDER — PROPRANOLOL HYDROCHLORIDE 10 MG/1
10 TABLET ORAL 3 TIMES DAILY PRN
Qty: 90 TABLET | Refills: 1 | Status: SHIPPED | OUTPATIENT
Start: 2021-07-06 | End: 2021-09-07

## 2021-07-07 NOTE — PROGRESS NOTES
Patient Name: Rhona Diaz  MRN: [id number]   :  2000     Chief Complaint:      ICD-10-CM ICD-9-CM   1. Generalized anxiety disorder  F41.1 300.02   2. Bipolar 2 disorder (CMS/[facility])  F31.81 296.89       History of Present Illness: Rhona Diaz is a 21 y.o. female is here today for medication management follow up.  Patient states she feels she just got over a hypomanic episode.  Could not focus at work for about 2 weeks.  States she had visual hallucinations of shadows.  States she was very impulsive and spending.  Almost bought a piano however they were out so that was prevented.  Patient states she also notices more social anxiety.    The following portions of the patient's history were reviewed and updated as appropriate: allergies, current medications, past family history, past medical history, past social history, past surgical history and problem list.    Review of Systems;;  Review of Systems   Constitutional: Negative for activity change, appetite change, fatigue, unexpected weight gain and unexpected weight loss.   Respiratory: Negative for shortness of breath and wheezing.    Gastrointestinal: Negative for constipation, diarrhea, nausea and vomiting.   Musculoskeletal: Negative for gait problem.   Skin: Negative for dry skin and rash.   Neurological: Negative for dizziness, speech difficulty, weakness, light-headedness, headache, memory problem and confusion.   Psychiatric/Behavioral: Positive for stress. Negative for agitation, behavioral problems, decreased concentration, dysphoric mood, hallucinations, self-injury, sleep disturbance, suicidal ideas, negative for hyperactivity and depressed mood. The patient is nervous/anxious.        Physical Exam;;  Physical Exam  Vitals and nursing note reviewed.   Constitutional:       General: She is not in acute distress.     Appearance: She is well-developed. She is not diaphoretic.   HENT:      Head: Normocephalic and atraumatic.   Eyes:       "Conjunctiva/sclera: Conjunctivae normal.   Cardiovascular:      Rate and Rhythm: Normal rate.   Pulmonary:      Effort: Pulmonary effort is normal. No respiratory distress.   Musculoskeletal:         General: Normal range of motion.      Cervical back: Full passive range of motion without pain and normal range of motion.   Skin:     General: Skin is warm and dry.   Neurological:      Mental Status: She is alert and oriented to person, place, and time.   Psychiatric:         Mood and Affect: Mood is anxious. Mood is not depressed. Affect is not labile, blunt, angry or inappropriate.         Speech: Speech is not rapid and pressured or tangential.         Behavior: Behavior normal. Behavior is not agitated, slowed, aggressive, withdrawn, hyperactive or combative. Behavior is cooperative.         Thought Content: Thought content normal. Thought content is not paranoid or delusional. Thought content does not include homicidal or suicidal ideation. Thought content does not include homicidal or suicidal plan.         Judgment: Judgment normal.       Blood pressure 118/62, height 170.2 cm (67\"), weight 73.8 kg (162 lb 12.8 oz), not currently breastfeeding.  Body mass index is 25.5 kg/m².    Current Medications;;    Current Outpatient Medications:   •  lithium (ESKALITH) 450 MG CR tablet, Take 1 tablet by mouth Every Night., Disp: 30 tablet, Rfl: 1  •  propranolol (INDERAL) 10 MG tablet, Take 1 tablet by mouth 3 (Three) Times a Day As Needed (anxiety)., Disp: 90 tablet, Rfl: 1    Lab Results:   No visits with results within 3 Month(s) from this visit.   Latest known visit with results is:   Admission on 11/07/2020, Discharged on 11/07/2020   Component Date Value Ref Range Status   • Ethanol 11/07/2020 <10  0 - 10 mg/dL Final   • Ethanol % 11/07/2020 <0.010  % Final   • THC, Screen, Urine 11/07/2020 Negative  Negative Final   • Phencyclidine (PCP), Urine 11/07/2020 Negative  Negative Final   • Cocaine Screen, Urine " 11/07/2020 Negative  Negative Final   • Methamphetamine, Ur 11/07/2020 Negative  Negative Final   • Opiate Screen 11/07/2020 Negative  Negative Final   • Amphetamine Screen, Urine 11/07/2020 Negative  Negative Final   • Benzodiazepine Screen, Urine 11/07/2020 Negative  Negative Final   • Tricyclic Antidepressants Screen 11/07/2020 Negative  Negative Final   • Methadone Screen, Urine 11/07/2020 Negative  Negative Final   • Barbiturates Screen, Urine 11/07/2020 Negative  Negative Final   • Oxycodone Screen, Urine 11/07/2020 Negative  Negative Final   • Propoxyphene Screen 11/07/2020 Negative  Negative Final   • Buprenorphine, Screen, Urine 11/07/2020 Negative  Negative Final   • Extra Tube 11/07/2020 hold for add-on   Final    Auto resulted   • Extra Tube 11/07/2020 Hold for add-ons.   Final    Auto resulted.   • Extra Tube 11/07/2020 hold for add-on   Final    Auto resulted   • Extra Tube 11/07/2020 Hold for add-ons.   Final    Auto resulted.   • Glucose 11/07/2020 90  65 - 99 mg/dL Final   • BUN 11/07/2020 11  6 - 20 mg/dL Final   • Creatinine 11/07/2020 0.81  0.57 - 1.00 mg/dL Final   • Sodium 11/07/2020 140  136 - 145 mmol/L Final   • Potassium 11/07/2020 3.9  3.5 - 5.2 mmol/L Final   • Chloride 11/07/2020 103  98 - 107 mmol/L Final   • CO2 11/07/2020 29.0  22.0 - 29.0 mmol/L Final   • Calcium 11/07/2020 9.4  8.6 - 10.5 mg/dL Final   • Total Protein 11/07/2020 7.6  6.0 - 8.5 g/dL Final   • Albumin 11/07/2020 4.70  3.50 - 5.20 g/dL Final   • ALT (SGPT) 11/07/2020 9  1 - 33 U/L Final   • AST (SGOT) 11/07/2020 18  1 - 32 U/L Final   • Alkaline Phosphatase 11/07/2020 75  39 - 117 U/L Final   • Total Bilirubin 11/07/2020 0.5  0.0 - 1.2 mg/dL Final   • eGFR Non African Amer 11/07/2020 88  >60 mL/min/1.73 Final   • Globulin 11/07/2020 2.9  gm/dL Final   • A/G Ratio 11/07/2020 1.6  g/dL Final   • BUN/Creatinine Ratio 11/07/2020 13.6  7.0 - 25.0 Final   • Anion Gap 11/07/2020 8.0  5.0 - 15.0 mmol/L Final   • Glucose  11/07/2020 90  65 - 99 mg/dL Final   • BUN 11/07/2020 11  6 - 20 mg/dL Final   • Creatinine 11/07/2020 0.81  0.57 - 1.00 mg/dL Final   • Sodium 11/07/2020 140  136 - 145 mmol/L Final   • Potassium 11/07/2020 3.9  3.5 - 5.2 mmol/L Final   • Chloride 11/07/2020 103  98 - 107 mmol/L Final   • CO2 11/07/2020 29.0  22.0 - 29.0 mmol/L Final   • Calcium 11/07/2020 9.4  8.6 - 10.5 mg/dL Final   • eGFR Non African Amer 11/07/2020 88  >60 mL/min/1.73 Final   • BUN/Creatinine Ratio 11/07/2020 13.6  7.0 - 25.0 Final   • Anion Gap 11/07/2020 8.0  5.0 - 15.0 mmol/L Final   • HCG, Urine QL 11/07/2020 Negative  Negative Final   • Color, UA 11/07/2020 Dark Yellow* Yellow, Straw Final   • Appearance, UA 11/07/2020 Clear  Clear Final   • pH, UA 11/07/2020 6.5  5.0 - 8.0 Final   • Specific Gravity, UA 11/07/2020 >=1.030  1.005 - 1.030 Final   • Glucose, UA 11/07/2020 Negative  Negative Final   • Ketones, UA 11/07/2020 Trace* Negative Final   • Bilirubin, UA 11/07/2020 Negative  Negative Final   • Blood, UA 11/07/2020 Negative  Negative Final   • Protein, UA 11/07/2020 Negative  Negative Final   • Leuk Esterase, UA 11/07/2020 Negative  Negative Final   • Nitrite, UA 11/07/2020 Negative  Negative Final   • Urobilinogen, UA 11/07/2020 0.2 E.U./dL  0.2 - 1.0 E.U./dL Final   • Acetaminophen 11/07/2020 <5.0  0.0 - 30.0 mcg/mL Final   • Salicylate 11/07/2020 <0.3  <=30.0 mg/dL Final       Mental Status Exam:   Hygiene:   good  Cooperation:  Cooperative  Eye Contact:  Good  Psychomotor Behavior:  Appropriate  Mood:anxious  Affect:  Appropriate  Hopelessness: Denies  Speech:  Normal  Thought Process:  Goal directed  Thought Content:  Normal  Suicidal:  None  Homicidal:  None  Hallucinations:  None  Delusion:  None  Memory:  Intact  Orientation:  Person, Place, Time and Situation  Reliability:  good  Insight:  Good  Judgement:  Good  Impulse Control:  Good  Physical/Medical Issues:  No     PHQ-9 Depression Screening  Little interest or pleasure  in doing things? 0   Feeling down, depressed, or hopeless? 1   Trouble falling or staying asleep, or sleeping too much? 3   Feeling tired or having little energy? 3   Poor appetite or overeating? 0   Feeling bad about yourself - or that you are a failure or have let yourself or your family down? 1   Trouble concentrating on things, such as reading the newspaper or watching television? 1   Moving or speaking so slowly that other people could have noticed? Or the opposite - being so fidgety or restless that you have been moving around a lot more than usual? 1   Thoughts that you would be better off dead, or of hurting yourself in some way? 0   PHQ-9 Total Score 10   If you checked off any problems, how difficult have these problems made it for you to do your work, take care of things at home, or get along with other people? Very difficult        Assessment/Plan:  Diagnoses and all orders for this visit:    1. Generalized anxiety disorder (Primary)  -     Ambulatory Referral to Behavioral Health  -     propranolol (INDERAL) 10 MG tablet; Take 1 tablet by mouth 3 (Three) Times a Day As Needed (anxiety).  Dispense: 90 tablet; Refill: 1    2. Bipolar 2 disorder (CMS/[facility])  -     Ambulatory Referral to Behavioral Health  -     lithium (ESKALITH) 450 MG CR tablet; Take 1 tablet by mouth Every Night.  Dispense: 30 tablet; Refill: 1  -     Lithium Level  -     Comprehensive Metabolic Panel  -     Thyroid Panel With TSH      Add propranolol 10 mg up to 3 times a day as needed for anxiety.  Increase lithium to 450 mg at night.  Add lab work related to lithium use.  Patient would like to trial a new therapist.    A psychological evaluation was conducted in order to assess past and current level of functioning. Areas assessed included, but were not limited to: perception of social support, perception of ability to face and deal with challenges in life (positive functioning), anxiety symptoms, depressive symptoms, perspective on  beliefs/belief system, coping skills for stress, intelligence level,  Therapeutic rapport was established. Interventions conducted today were geared towards incorporating medication management along with support for continued therapy. Education was also provided as to the med management with this provider and what to expect in subsequent sessions.    We discussed risks, benefits,goals and side effects of the above medication and the patient was agreeable with the plan.Patient was educated on the importance of compliance with treatment and follow-up appointments. Patient is aware to contact the [facility] with any worsening of symptoms. To call for questions or concerns and return early if necessary. Patent is agreeable to go to the Emergency Department or call 911 should they begin SI/HI.     Treatment Plan:   Discussed risks, benefits, and alternatives of medication. Encouraged healthy habits (eating, exercise and sleep). Call if any questions or problems arise. Medication reconciled. Controlled substance monitoring report reviewed. Provided psychoeducation.. Discussed coping strategies and current stressors. Set appropriate boundaries and limits for patient's well-being. Use distraction techniques to improve symptoms. Access support networks.      Return in about 4 weeks (around 8/3/2021) for Follow Up 15 min.    RITESH Sherwood

## 2021-08-09 ENCOUNTER — OFFICE VISIT (OUTPATIENT)
Dept: PSYCHIATRY | Facility: CLINIC | Age: 21
End: 2021-08-09

## 2021-08-09 DIAGNOSIS — F31.81 BIPOLAR II DISORDER (HCC): Primary | ICD-10-CM

## 2021-08-09 DIAGNOSIS — F40.10 SOCIAL ANXIETY DISORDER: ICD-10-CM

## 2021-08-09 PROCEDURE — 90791 PSYCH DIAGNOSTIC EVALUATION: CPT | Performed by: SOCIAL WORKER
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
## 2021-09-05 DIAGNOSIS — F31.81 BIPOLAR 2 DISORDER (HCC): ICD-10-CM

## 2021-09-05 DIAGNOSIS — F41.1 GENERALIZED ANXIETY DISORDER: ICD-10-CM

## 2021-09-07 RX ORDER — LITHIUM CARBONATE 450 MG
450 TABLET, EXTENDED RELEASE ORAL NIGHTLY
Qty: 30 TABLET | Refills: 0 | Status: SHIPPED | OUTPATIENT
Start: 2021-09-07 | End: 2021-12-13 | Stop reason: SDUPTHER

## 2021-09-07 RX ORDER — PROPRANOLOL HYDROCHLORIDE 10 MG/1
10 TABLET ORAL 3 TIMES DAILY PRN
Qty: 90 TABLET | Refills: 0 | Status: SHIPPED | OUTPATIENT
Start: 2021-09-07 | End: 2022-05-23 | Stop reason: SDUPTHER

## 2021-09-07 NOTE — TELEPHONE ENCOUNTER
Rx Refill Note  Requested Prescriptions     Pending Prescriptions Disp Refills   • lithium (ESKALITH) 450 MG CR tablet [Pharmacy Med Name: LITHIUM CARBONATE ER 450MG TABLETS] 30 tablet 1     Sig: TAKE 1 TABLET BY MOUTH EVERY NIGHT   • propranolol (INDERAL) 10 MG tablet [Pharmacy Med Name: PROPRANOLOL 10MG TABLETS] 90 tablet 1     Sig: TAKE 1 TABLET BY MOUTH THREE TIMES DAILY AS NEEDED FOR ANXIETY      Last office visit with prescribing clinician: 7/6/2021      Next office visit with prescribing clinician: Visit date not found            Myrtle Morin MA  09/07/21, 10:30 EDT

## 2021-09-22 ENCOUNTER — OFFICE VISIT (OUTPATIENT)
Dept: PSYCHIATRY | Facility: CLINIC | Age: 21
End: 2021-09-22

## 2021-09-22 DIAGNOSIS — F31.81 BIPOLAR II DISORDER (HCC): Primary | ICD-10-CM

## 2021-09-22 DIAGNOSIS — F40.10 SOCIAL ANXIETY DISORDER: ICD-10-CM

## 2021-09-22 PROCEDURE — 90837 PSYTX W PT 60 MINUTES: CPT | Performed by: SOCIAL WORKER

## 2021-09-22 NOTE — PROGRESS NOTES
"Date: 09/22/2021   Time In: 1000  Time Out: 1100    PROGRESS NOTE  Data:  Rhona Diaz is a 21 y.o. female who presents today for individual therapy session at [facility].     ICD-10-CM ICD-9-CM   1. Bipolar II disorder (HCC)  F31.81 296.89   2. Social anxiety disorder  F40.10 300.23     Patient reports feeling \"good\". Patient discussed she now goes by \"Alejandro\". Patient reports also using a binder and reports feeling comfortable with the decision. Patient discussed mood has improved, sleeping better and medications are working well. Patient discussed feeling anxious about telling parents about these changes because they have not been supportive in the past. Patient continues to endorse social anxiety and feeling \"hot and dizzy\" in public. Patient discussed that her father is a  and is feeling nervious about discussing being trans, sexuality and changing Yazidi beliefs. 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 Patient discussed feeling like the Sunday school class has been more of a support group and casual conversation. Patient reports wanting to move away to experience new things but also to distance herself from her parents. Patient discussed getting rid of a firearm in her home as a preventative step regarding mental health concerns. Patient denies suicidal ideations and self harm. Patient goal is \"be more comfortable talking about my new self\".       Clinical Maneuvering/Intervention:    (Scales based on 0 - 10 with 10 being the worst)  Depression:   na Anxiety:  na       Assisted patient in processing above session content; acknowledged and normalized patient’s thoughts, feelings, and concerns.  Rationalized patient thought process regarding fears about talking with parents.  Discussed triggers associated with patient's anxiety.  Also discussed coping skills for patient to implement such as challenging anxious " thoughts, breathing exercises and imagery.    Allowed patient to freely discuss issues without interruption or judgment. Provided safe, confidential environment to facilitate the development of positive therapeutic relationship and encourage open, honest communication. Assisted patient in identifying risk factors which would indicate the need for higher level of care including thoughts to harm self or others and/or self-harming behavior and encouraged patient to contact this office, call 911, or present to the nearest emergency room should any of these events occur. Discussed crisis intervention services and means to access. Patient adamantly and convincingly denies current suicidal or homicidal ideation or perceptual disturbance.    Assessment   Patient appears to maintain relative stability as compared to their baseline.  However, patient continues to struggle with depression and social anxiety which continues to cause impairment in important areas of functioning.  As a result, they can be reasonably expected to continue to benefit from treatment and would likely be at increased risk for decompensation otherwise.    Mental Status Exam:   Hygiene:   good  Cooperation:  Cooperative  Eye Contact:  Good  Psychomotor Behavior:  Appropriate  Affect:  Appropriate  Mood: normal  Speech:  Normal  Thought Process:  Goal directed  Thought Content:  Normal  Suicidal:  None  Homicidal:  None  Hallucinations:  None  Delusion:  None  Memory:  Intact  Orientation:  Grossly intact  Reliability:  good  Insight:  Fair  Judgement:  Fair  Impulse Control:  Fair  Physical/Medical Issues:  No      Patient's Support Network Includes:  significant other    Functional Status: Moderate impairment     Progress toward goal: Not at goal    Prognosis: Good with Ongoing Treatment          Plan     Patient will continue in individual outpatient therapy with focus on improved functioning and coping skills, maintaining stability, and avoiding  decompensation and the need for higher level of care.    Patient will adhere to medication regimen as prescribed and report any side effects. Patient will contact this office, call 911 or present to the nearest emergency room should suicidal or homicidal ideations occur. Provide Cognitive Behavioral Therapy and Solution Focused Therapy to improve functioning, maintain stability, and avoid decompensation and the need for higher level of care.     Return in about Return in about 2 weeks (around 10/6/2021) for Next scheduled follow up. or earlier if symptoms worsen or fail to improve.            This document has been electronically signed by Judy Mayo LCSW  September 22, 2021 10:01 EDT      Part of this note may be an electronic transcription/translation of spoken language to printed text using the Dragon Dictation System.

## 2021-10-05 DIAGNOSIS — F31.81 BIPOLAR 2 DISORDER (HCC): ICD-10-CM

## 2021-10-05 DIAGNOSIS — F41.1 GENERALIZED ANXIETY DISORDER: ICD-10-CM

## 2021-10-11 RX ORDER — PROPRANOLOL HYDROCHLORIDE 10 MG/1
TABLET ORAL
Qty: 90 TABLET | Refills: 0 | OUTPATIENT
Start: 2021-10-11

## 2021-10-11 RX ORDER — LITHIUM CARBONATE 450 MG
TABLET, EXTENDED RELEASE ORAL
Qty: 30 TABLET | Refills: 0 | OUTPATIENT
Start: 2021-10-11

## 2021-11-09 ENCOUNTER — OFFICE VISIT (OUTPATIENT)
Dept: PSYCHIATRY | Facility: CLINIC | Age: 21
End: 2021-11-09

## 2021-11-09 DIAGNOSIS — F40.10 SOCIAL ANXIETY DISORDER: ICD-10-CM

## 2021-11-09 DIAGNOSIS — F31.81 BIPOLAR II DISORDER (HCC): Primary | ICD-10-CM

## 2021-11-09 PROCEDURE — 90837 PSYTX W PT 60 MINUTES: CPT | Performed by: SOCIAL WORKER

## 2021-11-09 NOTE — PROGRESS NOTES
"Date: 11/09/2021   Time In: 1000  Time Out: 1100    PROGRESS NOTE  Data:  Rhona iDaz is a 21 y.o. female who presents today for individual therapy session at [facility].     ICD-10-CM ICD-9-CM   1. Bipolar II disorder (HCC)  F31.81 296.89   2. Social anxiety disorder  F40.10 300.23     Patient reports feeling \"about the same\". Patient discussed visiting partner in [place], and reports visit went well. Patient discussed receiving positive support when sharing new identity with friends. Patient reports having to move out of the city if they were to share new identity with parents. Patient discussed experiencing a panic attack during a [employer] service while playing the [employer]. Patient discussed feeling triggered by a song discussing mental health issues. Patient also discussed feeling \"angry\" regarding a demonstration that involved discussing a [employer] member's history of self harm behaviors. Patient discussed struggling with physical appearance and experiencing back pain while wearing binders. Patient discussed having concerns about having Autism. Patient endorses difficulty with social cues. Patient denies suicidal ideations.      Clinical Maneuvering/Intervention:    (Scales based on 0 - 10 with 10 being the worst)  Depression:   na Anxiety:  na       Assisted patient in processing above session content; acknowledged and normalized patient’s thoughts, feelings, and concerns.  Rationalized patient thought process regarding concerns about Autism.  Discussed triggers associated with patient's anxiety.  Also discussed coping skills for patient to implement such as positive self talk and mindfulness.    Allowed patient to freely discuss issues without interruption or judgment. Provided safe, confidential environment to facilitate the development of positive therapeutic relationship and encourage open, honest communication. Assisted patient in identifying risk factors which would indicate the need for higher " level of care including thoughts to harm self or others and/or self-harming behavior and encouraged patient to contact this office, call 911, or present to the nearest emergency room should any of these events occur. Discussed crisis intervention services and means to access. Patient adamantly and convincingly denies current suicidal or homicidal ideation or perceptual disturbance.    Assessment   Patient appears to maintain relative stability as compared to their baseline.  However, patient continues to struggle with mood instability and anxiety which continues to cause impairment in important areas of functioning.  As a result, they can be reasonably expected to continue to benefit from treatment and would likely be at increased risk for decompensation otherwise.    Mental Status Exam:   Hygiene:   good  Cooperation:  Cooperative  Eye Contact:  Good  Psychomotor Behavior:  Appropriate  Affect:  Appropriate  Mood: anxious  Speech:  Normal  Thought Process:  Goal directed  Thought Content:  Normal  Suicidal:  None  Homicidal:  None  Hallucinations:  None  Delusion:  None  Memory:  Intact  Orientation:  Grossly intact  Reliability:  good  Insight:  Fair  Judgement:  Fair  Impulse Control:  Fair  Physical/Medical Issues:  Yes patient reports back pain     Patient's Support Network Includes:  significant other    Functional Status: Moderate impairment     Progress toward goal: Not at goal    Prognosis: Good with Ongoing Treatment          Plan     Patient will continue in individual outpatient therapy with focus on improved functioning and coping skills, maintaining stability, and avoiding decompensation and the need for higher level of care.    Patient will adhere to medication regimen as prescribed and report any side effects. Patient will contact this office, call 911 or present to the nearest emergency room should suicidal or homicidal ideations occur. Provide Cognitive Behavioral Therapy and Solution Focused  Therapy to improve functioning, maintain stability, and avoid decompensation and the need for higher level of care.     Return in about Return in about 2 weeks (around 11/23/2021) for Next scheduled follow up. or earlier if symptoms worsen or fail to improve.            This document has been electronically signed by Judy Mayo LCSW  November 9, 2021 10:01 EST      Part of this note may be an electronic transcription/translation of spoken language to printed text using the Dragon Dictation System.

## 2021-11-14 DIAGNOSIS — F31.81 BIPOLAR 2 DISORDER, MAJOR DEPRESSIVE EPISODE (HCC): ICD-10-CM

## 2021-11-14 DIAGNOSIS — F41.9 ANXIETY: ICD-10-CM

## 2021-11-15 RX ORDER — SERTRALINE HYDROCHLORIDE 100 MG/1
100 TABLET, FILM COATED ORAL DAILY
Qty: 30 TABLET | Refills: 6 | OUTPATIENT
Start: 2021-11-15

## 2021-11-15 RX ORDER — LITHIUM CARBONATE 300 MG/1
300 CAPSULE ORAL DAILY
Qty: 30 CAPSULE | Refills: 6 | OUTPATIENT
Start: 2021-11-15

## 2021-11-18 ENCOUNTER — TELEPHONE (OUTPATIENT)
Dept: INTERNAL MEDICINE | Facility: CLINIC | Age: 21
End: 2021-11-18

## 2021-11-18 DIAGNOSIS — F41.9 ANXIETY: Primary | ICD-10-CM

## 2021-11-18 RX ORDER — SERTRALINE HYDROCHLORIDE 100 MG/1
100 TABLET, FILM COATED ORAL DAILY
Qty: 30 TABLET | Refills: 2 | Status: SHIPPED | OUTPATIENT
Start: 2021-11-18 | End: 2022-02-15

## 2021-11-18 NOTE — TELEPHONE ENCOUNTER
I see it dc'd it looks like she has still been taking it. I Sent a few months. Needs to come in for an appt it has been a while .

## 2021-11-18 NOTE — TELEPHONE ENCOUNTER
Pt stated she needed a refill on the lithium, pt stated she was also completely out of the zoloft, zoloft is not on med list pt stated Wiley had previously prescribed but the last two refills had been upped by edgar

## 2021-12-01 ENCOUNTER — OFFICE VISIT (OUTPATIENT)
Dept: PSYCHIATRY | Facility: CLINIC | Age: 21
End: 2021-12-01

## 2021-12-01 DIAGNOSIS — F40.10 SOCIAL ANXIETY DISORDER: ICD-10-CM

## 2021-12-01 DIAGNOSIS — F31.81 BIPOLAR II DISORDER (HCC): Primary | ICD-10-CM

## 2021-12-01 PROCEDURE — 90837 PSYTX W PT 60 MINUTES: CPT | Performed by: SOCIAL WORKER
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
## 2021-12-13 ENCOUNTER — HOSPITAL ENCOUNTER (EMERGENCY)
Facility: HOSPITAL | Age: 21
Discharge: HOME | End: 2021-12-13
Attending: EMERGENCY MEDICINE | Admitting: EMERGENCY MEDICINE

## 2021-12-13 VITALS
BODY MASS INDEX: 24.33 KG/M2 | HEART RATE: 76 BPM | DIASTOLIC BLOOD PRESSURE: 75 MMHG | SYSTOLIC BLOOD PRESSURE: 115 MMHG | HEIGHT: 67 IN | TEMPERATURE: 98.5 F | RESPIRATION RATE: 16 BRPM | WEIGHT: 155 LBS | OXYGEN SATURATION: 100 %

## 2021-12-13 DIAGNOSIS — F31.81 BIPOLAR 2 DISORDER (HCC): ICD-10-CM

## 2021-12-13 DIAGNOSIS — R42 DIZZINESS: Primary | ICD-10-CM

## 2021-12-13 LAB
ALBUMIN SERPL-MCNC: 4.3 G/DL (ref 3.5–5.2)
ALBUMIN/GLOB SERPL: 1.7 G/DL
ALP SERPL-CCNC: 63 U/L (ref 39–117)
ALT SERPL W P-5'-P-CCNC: 12 U/L (ref 1–33)
ANION GAP SERPL CALCULATED.3IONS-SCNC: 9.6 MMOL/L (ref 5–15)
AST SERPL-CCNC: 17 U/L (ref 1–32)
BASOPHILS # BLD AUTO: 0.04 10*3/MM3 (ref 0–0.2)
BASOPHILS NFR BLD AUTO: 0.8 % (ref 0–1.5)
BILIRUB SERPL-MCNC: 0.2 MG/DL (ref 0–1.2)
BILIRUB UR QL STRIP: NEGATIVE
BUN SERPL-MCNC: 9 MG/DL (ref 6–20)
BUN/CREAT SERPL: 12.5 (ref 7–25)
CALCIUM SPEC-SCNC: 8.8 MG/DL (ref 8.6–10.5)
CHLORIDE SERPL-SCNC: 105 MMOL/L (ref 98–107)
CLARITY UR: CLEAR
CO2 SERPL-SCNC: 25.4 MMOL/L (ref 22–29)
COLOR UR: YELLOW
CREAT SERPL-MCNC: 0.72 MG/DL (ref 0.57–1)
DEPRECATED RDW RBC AUTO: 39.3 FL (ref 37–54)
EOSINOPHIL # BLD AUTO: 0.29 10*3/MM3 (ref 0–0.4)
EOSINOPHIL NFR BLD AUTO: 5.5 % (ref 0.3–6.2)
ERYTHROCYTE [DISTWIDTH] IN BLOOD BY AUTOMATED COUNT: 12.6 % (ref 12.3–15.4)
GFR SERPL CREATININE-BSD FRML MDRD: 100 ML/MIN/1.73
GLOBULIN UR ELPH-MCNC: 2.6 GM/DL
GLUCOSE SERPL-MCNC: 104 MG/DL (ref 65–99)
GLUCOSE UR STRIP-MCNC: NEGATIVE MG/DL
HCG SERPL QL: NEGATIVE
HCT VFR BLD AUTO: 38.8 % (ref 34–46.6)
HGB BLD-MCNC: 12.4 G/DL (ref 12–15.9)
HGB UR QL STRIP.AUTO: NEGATIVE
HOLD SPECIMEN: NORMAL
IMM GRANULOCYTES # BLD AUTO: 0.02 10*3/MM3 (ref 0–0.05)
IMM GRANULOCYTES NFR BLD AUTO: 0.4 % (ref 0–0.5)
KETONES UR QL STRIP: NEGATIVE
LEUKOCYTE ESTERASE UR QL STRIP.AUTO: NEGATIVE
LYMPHOCYTES # BLD AUTO: 1.57 10*3/MM3 (ref 0.7–3.1)
LYMPHOCYTES NFR BLD AUTO: 29.8 % (ref 19.6–45.3)
MAGNESIUM SERPL-MCNC: 2 MG/DL (ref 1.6–2.6)
MCH RBC QN AUTO: 27.5 PG (ref 26.6–33)
MCHC RBC AUTO-ENTMCNC: 32 G/DL (ref 31.5–35.7)
MCV RBC AUTO: 86 FL (ref 79–97)
MONOCYTES # BLD AUTO: 0.4 10*3/MM3 (ref 0.1–0.9)
MONOCYTES NFR BLD AUTO: 7.6 % (ref 5–12)
NEUTROPHILS NFR BLD AUTO: 2.95 10*3/MM3 (ref 1.7–7)
NEUTROPHILS NFR BLD AUTO: 55.9 % (ref 42.7–76)
NITRITE UR QL STRIP: NEGATIVE
NRBC BLD AUTO-RTO: 0 /100 WBC (ref 0–0.2)
PH UR STRIP.AUTO: <=5 [PH] (ref 5–8)
PLATELET # BLD AUTO: 320 10*3/MM3 (ref 140–450)
PMV BLD AUTO: 8.3 FL (ref 6–12)
POTASSIUM SERPL-SCNC: 3.8 MMOL/L (ref 3.5–5.2)
PROT SERPL-MCNC: 6.9 G/DL (ref 6–8.5)
PROT UR QL STRIP: NEGATIVE
RBC # BLD AUTO: 4.51 10*6/MM3 (ref 3.77–5.28)
SODIUM SERPL-SCNC: 140 MMOL/L (ref 136–145)
SP GR UR STRIP: 1.01 (ref 1–1.03)
TROPONIN T SERPL-MCNC: <0.01 NG/ML (ref 0–0.03)
UROBILINOGEN UR QL STRIP: NORMAL
WBC NRBC COR # BLD: 5.27 10*3/MM3 (ref 3.4–10.8)
WHOLE BLOOD HOLD SPECIMEN: NORMAL
WHOLE BLOOD HOLD SPECIMEN: NORMAL

## 2021-12-13 PROCEDURE — 96374 THER/PROPH/DIAG INJ IV PUSH: CPT

## 2021-12-13 PROCEDURE — 85025 COMPLETE CBC W/AUTO DIFF WBC: CPT

## 2021-12-13 PROCEDURE — 83735 ASSAY OF MAGNESIUM: CPT

## 2021-12-13 PROCEDURE — 93005 ELECTROCARDIOGRAM TRACING: CPT

## 2021-12-13 PROCEDURE — 84703 CHORIONIC GONADOTROPIN ASSAY: CPT

## 2021-12-13 PROCEDURE — 81003 URINALYSIS AUTO W/O SCOPE: CPT

## 2021-12-13 PROCEDURE — 25010000002 DIAZEPAM PER 5 MG: Performed by: EMERGENCY MEDICINE

## 2021-12-13 PROCEDURE — 84484 ASSAY OF TROPONIN QUANT: CPT

## 2021-12-13 PROCEDURE — 99283 EMERGENCY DEPT VISIT LOW MDM: CPT

## 2021-12-13 PROCEDURE — 80053 COMPREHEN METABOLIC PANEL: CPT

## 2021-12-13 RX ORDER — SODIUM CHLORIDE 0.9 % (FLUSH) 0.9 %
10 SYRINGE (ML) INJECTION AS NEEDED
Status: DISCONTINUED | OUTPATIENT
Start: 2021-12-13 | End: 2021-12-13 | Stop reason: HOSPADM

## 2021-12-13 RX ORDER — DIAZEPAM 5 MG/ML
5 INJECTION, SOLUTION INTRAMUSCULAR; INTRAVENOUS ONCE
Status: COMPLETED | OUTPATIENT
Start: 2021-12-13 | End: 2021-12-13

## 2021-12-13 RX ORDER — MECLIZINE HYDROCHLORIDE 25 MG/1
25 TABLET ORAL ONCE
Status: COMPLETED | OUTPATIENT
Start: 2021-12-13 | End: 2021-12-13

## 2021-12-13 RX ORDER — MECLIZINE HYDROCHLORIDE 25 MG/1
25 TABLET ORAL EVERY 6 HOURS PRN
Qty: 24 TABLET | Refills: 0 | Status: SHIPPED | OUTPATIENT
Start: 2021-12-13 | End: 2022-04-05

## 2021-12-13 RX ORDER — LITHIUM CARBONATE 450 MG
450 TABLET, EXTENDED RELEASE ORAL NIGHTLY
Qty: 14 TABLET | Refills: 0 | Status: SHIPPED | OUTPATIENT
Start: 2021-12-13 | End: 2022-04-04 | Stop reason: SDUPTHER

## 2021-12-13 RX ADMIN — DIAZEPAM 5 MG: 5 INJECTION, SOLUTION INTRAMUSCULAR; INTRAVENOUS at 08:14

## 2021-12-13 RX ADMIN — SODIUM CHLORIDE 1000 ML: 9 INJECTION, SOLUTION INTRAVENOUS at 08:13

## 2021-12-13 RX ADMIN — MECLIZINE HYDROCHLORIDE 25 MG: 25 TABLET ORAL at 08:13

## 2021-12-13 NOTE — ED PROVIDER NOTES
Subjective   Chief Complaint: Dizziness  History of Present Illness: 21-year-old female comes in for evaluation of above complaint.  She has a history of vertigo and states has had extensive work-up in the past with no clear etiology found other than likely being attributed to stress.  She states she is not had a episode of vertigo for several years however around 4:00 this morning she awoke and felt very nauseated.  She has a mild headache but no other complaints.  She denies chest pain, short of breath, recent URI symptoms.  Symptoms are made worse by change in position and movement of her head, they are made better by holding her head still and lying still  Onset: 4 AM  Timing: Intermittent  Exacerbating / Alleviating factors: Worse with movement better with holding still  Associated symptoms: Nausea, headache      Nurses Notes reviewed and agree, including vitals, allergies, social history and prior medical history.          Review of Systems   Constitutional: Negative.    Eyes: Negative.    Respiratory: Negative.  Negative for shortness of breath.    Cardiovascular: Negative.  Negative for chest pain.   Gastrointestinal: Positive for nausea. Negative for vomiting.   Genitourinary: Negative.    Musculoskeletal: Negative.    Skin: Negative.    Neurological: Positive for dizziness and headaches. Negative for syncope, weakness and numbness.   Psychiatric/Behavioral: Negative.        Past Medical History:   Diagnosis Date   • Anxiety    • Depression    • Dizziness 2015   • Environmental allergies    • Migraine 2019   • Multiple allergies    • Nosebleed    • Ringing in ears    • Seasonal allergies    • Sinus problem    • Snores        Allergies   Allergen Reactions   • Cat Hair Extract Hives     Itching, hives, rash    • Nuts Anaphylaxis   • Shellfish-Derived Products Anaphylaxis   • Tree Nut Anaphylaxis       Past Surgical History:   Procedure Laterality Date   • NO PAST SURGERIES         Family History   Problem  Relation Age of Onset   • No Known Problems Mother    • No Known Problems Father    • No Known Problems Sister    • No Known Problems Brother    • No Known Problems Son    • No Known Problems Daughter    • Heart disease Maternal Grandmother    • No Known Problems Maternal Grandfather    • Heart disease Paternal Grandmother    • Arthritis Paternal Grandmother    • No Known Problems Paternal Grandfather    • No Known Problems Cousin    • Lung cancer Other    • Skin cancer Other    • Diabetes Other    • Heart attack Other    • Hypertension Other    • Hyperlipidemia Other    • Mental illness Other    • Stroke Other    • Rheum arthritis Neg Hx    • Osteoarthritis Neg Hx    • Asthma Neg Hx    • Heart failure Neg Hx    • Migraines Neg Hx    • Rashes / Skin problems Neg Hx    • Seizures Neg Hx    • Thyroid disease Neg Hx    • Colon cancer Neg Hx    • Cirrhosis Neg Hx    • Liver disease Neg Hx    • Liver cancer Neg Hx    • Crohn's disease Neg Hx    • Ulcerative colitis Neg Hx    • Esophageal cancer Neg Hx    • Stomach cancer Neg Hx        Social History     Socioeconomic History   • Marital status: Single   Tobacco Use   • Smoking status: Never Smoker   • Smokeless tobacco: Never Used   Substance and Sexual Activity   • Alcohol use: No   • Drug use: No   • Sexual activity: Defer           Objective   Physical Exam  Vitals and nursing note reviewed.   Constitutional:       General: She is not in acute distress.     Appearance: Normal appearance. She is normal weight. She is not ill-appearing or toxic-appearing.   HENT:      Head: Atraumatic.      Right Ear: Tympanic membrane and ear canal normal.      Left Ear: Tympanic membrane and ear canal normal.   Eyes:      Extraocular Movements: Extraocular movements intact.   Cardiovascular:      Rate and Rhythm: Normal rate and regular rhythm.      Pulses: Normal pulses.      Heart sounds: Normal heart sounds.   Pulmonary:      Effort: Pulmonary effort is normal.   Abdominal:       General: Abdomen is flat.      Palpations: Abdomen is soft.      Tenderness: There is no abdominal tenderness.   Musculoskeletal:         General: Normal range of motion.      Cervical back: Normal range of motion.   Skin:     General: Skin is warm and dry.   Neurological:      General: No focal deficit present.      Mental Status: She is alert and oriented to person, place, and time. Mental status is at baseline.      GCS: GCS eye subscore is 4. GCS verbal subscore is 5. GCS motor subscore is 6.      Cranial Nerves: Cranial nerves are intact.      Sensory: Sensation is intact.      Motor: Motor function is intact. No weakness.      Coordination: Coordination is intact. Finger-Nose-Finger Test normal.      Gait: Gait is intact.   Psychiatric:         Mood and Affect: Mood normal.         Behavior: Behavior normal.         Procedures           ED Course  ED Course as of 12/13/21 0911   Mon Dec 13, 2021   0813 EKG interpreted by me.  Sinus rhythm.  Rate of 82.  No ST segment or T wave changes.  Normal EKG [CG]   0834 Troponin T: <0.010 [TM]   0834 Magnesium: 2.0 [TM]   0834 Glucose(!): 104 [TM]   0834 BUN: 9 [TM]   0834 WBC: 5.27 [TM]   0838 HCG Qualitative: Negative [TM]   0838 Hematocrit: 38.8 [TM]   0838 Hemoglobin: 12.4 [TM]      ED Course User Index  [CG] Fadi Calderon,   [TM] Magan Weaver PA-C                                                 [facility]  0911  Work appears reassuring.  Labs grossly within normal limits.  Discussed imaging with patient that she wishes to defer at this time given significant improvement.  She states she is out of her lithium.  We will give her a short refill and instructed to follow-up with PCP.  Final diagnoses:   Dizziness       ED Disposition  ED Disposition     ED Disposition Condition Comment    Discharge Stable           Mary Bowman MD  [address] [facility] [address]  [facility] [address] [phone]      As needed    [facility]  [address]  [facility] [address] [phone]    If symptoms worsen         Medication List      New Prescriptions    meclizine 25 MG tablet  Commonly known as: ANTIVERT  Take 1 tablet by mouth Every 6 (Six) Hours As Needed for Dizziness.           Where to Get Your Medications      These medications were sent to [id number] - [place], [place] - [address] AT [facility] [address] - [phone] PH - [phone] FX  [address], [facility] [address]    Phone: [phone]   · lithium 450 MG CR tablet  · meclizine 25 MG tablet          Magan Weaver PA-C  12/13/21 0930

## 2021-12-13 NOTE — ED NOTES
Lab called confirmed lithium level could be processed with blood already sent.      Donna Naylor RN  12/13/21 0379

## 2022-01-14 ENCOUNTER — OFFICE VISIT (OUTPATIENT)
Dept: PSYCHIATRY | Facility: CLINIC | Age: 22
End: 2022-01-14

## 2022-01-14 DIAGNOSIS — F31.81 BIPOLAR II DISORDER: Primary | ICD-10-CM

## 2022-01-14 DIAGNOSIS — F40.10 SOCIAL ANXIETY DISORDER: ICD-10-CM

## 2022-01-14 PROCEDURE — 90837 PSYTX W PT 60 MINUTES: CPT | Performed by: SOCIAL WORKER
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
## 2022-01-26 ENCOUNTER — OFFICE VISIT (OUTPATIENT)
Dept: PSYCHIATRY | Facility: CLINIC | Age: 22
End: 2022-01-26

## 2022-01-26 DIAGNOSIS — F31.81 BIPOLAR II DISORDER: Primary | ICD-10-CM

## 2022-01-26 DIAGNOSIS — F40.10 SOCIAL ANXIETY DISORDER: ICD-10-CM

## 2022-01-26 PROCEDURE — 90834 PSYTX W PT 45 MINUTES: CPT | Performed by: SOCIAL WORKER
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
## 2022-02-15 DIAGNOSIS — F41.9 ANXIETY: ICD-10-CM

## 2022-02-15 RX ORDER — SERTRALINE HYDROCHLORIDE 100 MG/1
100 TABLET, FILM COATED ORAL DAILY
Qty: 30 TABLET | Refills: 2 | Status: SHIPPED | OUTPATIENT
Start: 2022-02-15 | End: 2022-04-05 | Stop reason: SDUPTHER

## 2022-02-23 ENCOUNTER — OFFICE VISIT (OUTPATIENT)
Dept: PSYCHIATRY | Facility: CLINIC | Age: 22
End: 2022-02-23

## 2022-02-23 DIAGNOSIS — F40.10 SOCIAL ANXIETY DISORDER: ICD-10-CM

## 2022-02-23 DIAGNOSIS — F31.81 BIPOLAR II DISORDER: Primary | ICD-10-CM

## 2022-02-23 PROCEDURE — 90837 PSYTX W PT 60 MINUTES: CPT | Performed by: SOCIAL WORKER

## 2022-02-23 NOTE — PROGRESS NOTES
"Date: 02/23/2022  Time In: 1001  Time Out: 1100    PROGRESS NOTE  Data:  Rhona Diaz is a 21 y.o. female who presents today for individual therapy session at [facility].     ICD-10-CM ICD-9-CM   1. Bipolar II disorder (HCC)  F31.81 296.89   2. Social anxiety disorder  F40.10 300.23     Patient reports having \"good and bad days\" since last session. Patient discussed an anxiety attack that they had at a work training. Patient discussed having to leave the group in order to handle the anxiety. Patient discussed having a conversation with the manager the next day regarding the anxiety attack. Patient discussed the conversation with positive and reports feeling good about it. Patient discussed managers concerns about the quality of the work they are doing. Patient discussed the possibility this may be because of the depressed mood. Patient discussed upcoming vacation to visit partner next month. Patient discussed thoughts of self harm and \"fleeting\" suicidal thoughts since last session.. Patient denies plans or intent with these thoughts. Patient discussed continuing to challenge those thoughts with \"this is temporary\" regarding her feelings in that moment.     Clinical Maneuvering/Intervention:    (Scales based on 0 - 10 with 10 being the worst)  Depression:   na Anxiety:  na       Assisted patient in processing above session content; acknowledged and normalized patient’s thoughts, feelings, and concerns.  Discussed core beliefs and how core beliefs can influence how we see ourselves, other people and the world. Discussed the importance of positive self talk when challenging negative thoughts about self. Discussed the importance of challenging anxious thoughts when in social situations.  Discussed triggers associated with patient's anxiety.  Also discussed coping skills for patient to implement such as grounding exercises, breathing and progressive muscle relaxation.    Allowed patient to freely " discuss issues without interruption or judgment. Provided safe, confidential environment to facilitate the development of positive therapeutic relationship and encourage open, honest communication. Assisted patient in identifying risk factors which would indicate the need for higher level of care including thoughts to harm self or others and/or self-harming behavior and encouraged patient to contact this office, call 911, or present to the nearest emergency room should any of these events occur. Discussed crisis intervention services and means to access. Patient adamantly and convincingly denies current suicidal or homicidal ideation or perceptual disturbance.    Assessment   Patient appears to maintain relative stability as compared to their baseline.  However, patient continues to struggle with anxiety which continues to cause impairment in important areas of functioning.  As a result, they can be reasonably expected to continue to benefit from treatment and would likely be at increased risk for decompensation otherwise.    Mental Status Exam:   Hygiene:   good  Cooperation:  Cooperative  Eye Contact:  Fair  Psychomotor Behavior:  Appropriate  Affect:  Appropriate  Mood: normal  Speech:  Normal  Thought Process:  Goal directed  Thought Content:  Normal  Suicidal:  None  Homicidal:  None  Hallucinations:  None  Delusion:  None  Memory:  Intact  Orientation:  Grossly intact  Reliability:  good  Insight:  Fair  Judgement:  Poor  Impulse Control:  Good  Physical/Medical Issues:  No      Patient's Support Network Includes:  significant other and parents    Functional Status: Moderate impairment     Progress toward goal: Not at goal    Prognosis: Good with Ongoing Treatment          Plan     Patient will continue in individual outpatient therapy with focus on improved functioning and coping skills, maintaining stability, and avoiding decompensation and the need for higher level of care.    Patient will adhere to  medication regimen as prescribed and report any side effects. Patient will contact this office, call 911 or present to the nearest emergency room should suicidal or homicidal ideations occur. Provide Cognitive Behavioral Therapy and Solution Focused Therapy to improve functioning, maintain stability, and avoid decompensation and the need for higher level of care.     Return in about Return in about 2 weeks (around 3/9/2022). or earlier if symptoms worsen or fail to improve.            This document has been electronically signed by Judy Mayo LCSW  February 23, 2022 10:01 EST      Part of this note may be an electronic transcription/translation of spoken language to printed text using the Dragon Dictation System.

## 2022-03-30 ENCOUNTER — OFFICE VISIT (OUTPATIENT)
Dept: PSYCHIATRY | Facility: CLINIC | Age: 22
End: 2022-03-30

## 2022-03-30 DIAGNOSIS — F40.10 SOCIAL ANXIETY DISORDER: ICD-10-CM

## 2022-03-30 DIAGNOSIS — F31.81 BIPOLAR II DISORDER: Primary | ICD-10-CM

## 2022-03-30 PROCEDURE — 90837 PSYTX W PT 60 MINUTES: CPT | Performed by: SOCIAL WORKER

## 2022-03-30 NOTE — PROGRESS NOTES
"Date: 03/30/2022  Time In: 1100  Time Out: 1156    PROGRESS NOTE  Data:  Rhona Diaz is a 21 y.o. female who presents today for individual therapy session at [facility].     ICD-10-CM ICD-9-CM   1. Bipolar II disorder (HCC)  F31.81 296.89   2. Social anxiety disorder  F40.10 300.23     Patient reports having \"good and bad days\" since last session. Patient discussed enjoying  trip to [place] to visit partner. Patient discussed challenges with work regarding getting to work on time and communicating with co workers. Patient discussed feeling when they are in a manic phase it is difficult to get to sleep and reports that effects if they are able to get up on time. Patient discussed having supportive friends to help with accountability. Patient discussed behaviors that partner noticed during the trip such as loud noises being a trigger as well as engaging in repetitive motions. Patient discussed having some thoughts of self harm for missing work but reports they did not act on them. Patient is interested in getting tested for autism. Patient discussed continuing to challenge those thoughts with \"this is temporary\" when having thoughts of self harm and learning new techniques to handle loud noises. Patient denies SI    Clinical Maneuvering/Intervention:    (Scales based on 0 - 10 with 10 being the worst)  Depression:   na Anxiety:  na       Assisted patient in processing above session content; acknowledged and normalized patient’s thoughts, feelings, and concerns.  Discussed core beliefs and how core beliefs can influence how we see ourselves, other people and the world. Discussed the importance of positive self talk when challenging negative thoughts about self. Discussed the importance of challenging anxious thoughts when in social situations.  Discussed triggers associated with patient's anxiety.  Also discussed coping skills for patient to implement such as grounding exercises, breathing and " progressive muscle relaxation.     Allowed patient to freely discuss issues without interruption or judgment. Provided safe, confidential environment to facilitate the development of positive therapeutic relationship and encourage open, honest communication. Assisted patient in identifying risk factors which would indicate the need for higher level of care including thoughts to harm self or others and/or self-harming behavior and encouraged patient to contact this office, call 911, or present to the nearest emergency room should any of these events occur. Discussed crisis intervention services and means to access. Patient adamantly and convincingly denies current suicidal or homicidal ideation or perceptual disturbance.    Assessment   Patient appears to maintain relative stability as compared to their baseline.  However, patient continues to struggle with anxiety which continues to cause impairment in important areas of functioning.  As a result, they can be reasonably expected to continue to benefit from treatment and would likely be at increased risk for decompensation otherwise.    Mental Status Exam:   Hygiene:   good  Cooperation:  Cooperative  Eye Contact:  Fair  Psychomotor Behavior:  Appropriate  Affect:  Appropriate  Mood: normal  Speech:  Normal  Thought Process:  Goal directed  Thought Content:  Normal  Suicidal:  None  Homicidal:  None  Hallucinations:  None  Delusion:  None  Memory:  Intact  Orientation:  Grossly intact  Reliability:  good  Insight:  Fair  Judgement:  Fair  Impulse Control:  Good  Physical/Medical Issues:  No      Patient's Support Network Includes:  significant other and parents    Functional Status: Mild impairment     Progress toward goal: Not at goal    Prognosis: Good with Ongoing Treatment          Plan     Patient will continue in individual outpatient therapy with focus on improved functioning and coping skills, maintaining stability, and avoiding decompensation and the need  for higher level of care.    Patient will adhere to medication regimen as prescribed and report any side effects. Patient will contact this office, call 911 or present to the nearest emergency room should suicidal or homicidal ideations occur. Provide Cognitive Behavioral Therapy and Solution Focused Therapy to improve functioning, maintain stability, and avoid decompensation and the need for higher level of care.     Return in about Return in about 2 weeks (around 4/13/2022). or earlier if symptoms worsen or fail to improve.            This document has been electronically signed by Judy Mayo LCSW  March 30, 2022 10:56 EDT      Part of this note may be an electronic transcription/translation of spoken language to printed text using the Dragon Dictation System.

## 2022-03-31 DIAGNOSIS — F31.81 BIPOLAR 2 DISORDER: ICD-10-CM

## 2022-04-04 ENCOUNTER — TELEPHONE (OUTPATIENT)
Dept: INTERNAL MEDICINE | Facility: CLINIC | Age: 22
End: 2022-04-04

## 2022-04-04 DIAGNOSIS — F31.81 BIPOLAR 2 DISORDER: ICD-10-CM

## 2022-04-04 RX ORDER — LITHIUM CARBONATE 450 MG
450 TABLET, EXTENDED RELEASE ORAL NIGHTLY
Qty: 30 TABLET | Refills: 3 | Status: SHIPPED | OUTPATIENT
Start: 2022-04-04 | End: 2022-04-05 | Stop reason: SDUPTHER

## 2022-04-04 NOTE — TELEPHONE ENCOUNTER
Incoming Refill Request      Medication requested (name and dose): LITHIUM     Pharmacy where request should be sent: [facility] [place]     Additional details provided by patient: PT STATED HAS BEEN OUT SINCE Thursday     Best call back number: [phone]    Does the patient have less than a 3 day supply:  [x] Yes  [] No    Allison Dominguez Rep  04/04/22, 10:29 EDT

## 2022-04-05 ENCOUNTER — OFFICE VISIT (OUTPATIENT)
Dept: BEHAVIORAL HEALTH | Facility: CLINIC | Age: 22
End: 2022-04-05

## 2022-04-05 VITALS
SYSTOLIC BLOOD PRESSURE: 110 MMHG | WEIGHT: 181 LBS | DIASTOLIC BLOOD PRESSURE: 64 MMHG | HEIGHT: 67 IN | BODY MASS INDEX: 28.41 KG/M2

## 2022-04-05 DIAGNOSIS — F31.81 BIPOLAR 2 DISORDER: Primary | ICD-10-CM

## 2022-04-05 DIAGNOSIS — F51.04 PSYCHOPHYSIOLOGICAL INSOMNIA: ICD-10-CM

## 2022-04-05 DIAGNOSIS — F41.9 ANXIETY: ICD-10-CM

## 2022-04-05 PROCEDURE — 99213 OFFICE O/P EST LOW 20 MIN: CPT | Performed by: NURSE PRACTITIONER

## 2022-04-05 RX ORDER — LITHIUM CARBONATE 450 MG
450 TABLET, EXTENDED RELEASE ORAL NIGHTLY
Qty: 30 TABLET | Refills: 3 | Status: SHIPPED | OUTPATIENT
Start: 2022-04-05 | End: 2022-08-01 | Stop reason: SDUPTHER

## 2022-04-05 RX ORDER — AMITRIPTYLINE HYDROCHLORIDE 25 MG/1
25 TABLET, FILM COATED ORAL NIGHTLY
Qty: 30 TABLET | Refills: 3 | OUTPATIENT
Start: 2022-04-05 | End: 2022-07-25

## 2022-04-05 RX ORDER — SERTRALINE HYDROCHLORIDE 100 MG/1
100 TABLET, FILM COATED ORAL DAILY
Qty: 30 TABLET | Refills: 6 | Status: SHIPPED | OUTPATIENT
Start: 2022-04-05 | End: 2022-08-10 | Stop reason: SDUPTHER

## 2022-04-05 NOTE — PROGRESS NOTES
Patient Name: Rhona Diaz  MRN: [id number]   :  2000     Chief Complaint:      ICD-10-CM ICD-9-CM   1. Bipolar 2 disorder (HCC)  F31.81 296.89   2. Anxiety  F41.9 300.00   3. Psychophysiological insomnia  F51.04 307.42       History of Present Illness: Rohna Diaz is a 21 y.o. female is here today for medication management follow up.  Patient has decided she would like to go by Alejandro and prefers to use they and them pronouns pronouns.  Patient states lithium is working well.  Patient states they are having trouble with sleep which is affecting their job.  Patient states they have been oversleeping for many hours and is at risk of losing their job at night they are having nightmares.  Patient has tried Remeron and states it makes him too sluggish.    The following portions of the patient's history were reviewed and updated as appropriate: allergies, current medications, past family history, past medical history, past social history, past surgical history and problem list.    Review of Systems;;  Review of Systems   Constitutional: Negative for activity change, appetite change, fatigue, unexpected weight gain and unexpected weight loss.   Respiratory: Negative for shortness of breath and wheezing.    Gastrointestinal: Negative for constipation, diarrhea, nausea and vomiting.   Musculoskeletal: Negative for gait problem.   Skin: Negative for dry skin and rash.   Neurological: Negative for dizziness, speech difficulty, weakness, light-headedness, headache, memory problem and confusion.   Psychiatric/Behavioral: Positive for sleep disturbance. Negative for agitation, behavioral problems, decreased concentration, dysphoric mood, hallucinations, self-injury, suicidal ideas, negative for hyperactivity, depressed mood and stress. The patient is not nervous/anxious.        Physical Exam;;  Physical Exam  Vitals and nursing note reviewed.   Constitutional:       General: She is not in acute distress.     Appearance:  "She is well-developed. She is not diaphoretic.   HENT:      Head: Normocephalic and atraumatic.   Eyes:      Conjunctiva/sclera: Conjunctivae normal.   Cardiovascular:      Rate and Rhythm: Normal rate.   Pulmonary:      Effort: Pulmonary effort is normal. No respiratory distress.   Musculoskeletal:         General: Normal range of motion.      Cervical back: Full passive range of motion without pain and normal range of motion.   Skin:     General: Skin is warm and dry.   Neurological:      Mental Status: She is alert and oriented to person, place, and time.   Psychiatric:         Mood and Affect: Mood is not anxious or depressed. Affect is not labile, blunt, angry or inappropriate.         Speech: Speech is not rapid and pressured or tangential.         Behavior: Behavior normal. Behavior is not agitated, slowed, aggressive, withdrawn, hyperactive or combative. Behavior is cooperative.         Thought Content: Thought content normal. Thought content is not paranoid or delusional. Thought content does not include homicidal or suicidal ideation. Thought content does not include homicidal or suicidal plan.         Judgment: Judgment normal.       Blood pressure 110/64, height 170.2 cm (67\"), weight 82.1 kg (181 lb), not currently breastfeeding.  Body mass index is 28.35 kg/m².    Current Medications;;    Current Outpatient Medications:   •  lithium (ESKALITH) 450 MG CR tablet, Take 1 tablet by mouth Every Night., Disp: 30 tablet, Rfl: 3  •  propranolol (INDERAL) 10 MG tablet, Take 1 tablet by mouth 3 (Three) Times a Day As Needed (for anxiety). MUST BE SEEN FOR ADDITIONAL REFILLS, Disp: 90 tablet, Rfl: 0  •  sertraline (ZOLOFT) 100 MG tablet, Take 1 tablet by mouth Daily., Disp: 30 tablet, Rfl: 6  •  amitriptyline (ELAVIL) 25 MG tablet, Take 1 tablet by mouth Every Night., Disp: 30 tablet, Rfl: 3    Lab Results:   No visits with results within 3 Month(s) from this visit.   Latest known visit with results is: "   Admission on 12/13/2021, Discharged on 12/13/2021   Component Date Value Ref Range Status   • Glucose 12/13/2021 104 (A) 65 - 99 mg/dL Final   • BUN 12/13/2021 9  6 - 20 mg/dL Final   • Creatinine 12/13/2021 0.72  0.57 - 1.00 mg/dL Final   • Sodium 12/13/2021 140  136 - 145 mmol/L Final   • Potassium 12/13/2021 3.8  3.5 - 5.2 mmol/L Final   • Chloride 12/13/2021 105  98 - 107 mmol/L Final   • CO2 12/13/2021 25.4  22.0 - 29.0 mmol/L Final   • Calcium 12/13/2021 8.8  8.6 - 10.5 mg/dL Final   • Total Protein 12/13/2021 6.9  6.0 - 8.5 g/dL Final   • Albumin 12/13/2021 4.30  3.50 - 5.20 g/dL Final   • ALT (SGPT) 12/13/2021 12  1 - 33 U/L Final   • AST (SGOT) 12/13/2021 17  1 - 32 U/L Final   • Alkaline Phosphatase 12/13/2021 63  39 - 117 U/L Final   • Total Bilirubin 12/13/2021 0.2  0.0 - 1.2 mg/dL Final   • eGFR Non African Amer 12/13/2021 100  >60 mL/min/1.73 Final   • Globulin 12/13/2021 2.6  gm/dL Final   • A/G Ratio 12/13/2021 1.7  g/dL Final   • BUN/Creatinine Ratio 12/13/2021 12.5  7.0 - 25.0 Final   • Anion Gap 12/13/2021 9.6  5.0 - 15.0 mmol/L Final   • Troponin T 12/13/2021 <0.010  0.000 - 0.030 ng/mL Final   • Magnesium 12/13/2021 2.0  1.6 - 2.6 mg/dL Final   • HCG Qualitative 12/13/2021 Negative  Negative Final   • Color, UA 12/13/2021 Yellow  Yellow, Straw Final   • Appearance, UA 12/13/2021 Clear  Clear Final   • pH, UA 12/13/2021 <=5.0  5.0 - 8.0 Final   • Specific Gravity, UA 12/13/2021 1.014  1.005 - 1.030 Final   • Glucose, UA 12/13/2021 Negative  Negative Final   • Ketones, UA 12/13/2021 Negative  Negative Final   • Bilirubin, UA 12/13/2021 Negative  Negative Final   • Blood, UA 12/13/2021 Negative  Negative Final   • Protein, UA 12/13/2021 Negative  Negative Final   • Leuk Esterase, UA 12/13/2021 Negative  Negative Final   • Nitrite, UA 12/13/2021 Negative  Negative Final   • Urobilinogen, UA 12/13/2021 0.2 E.U./dL  0.2 - 1.0 E.U./dL Final   • Extra Tube 12/13/2021 Hold for add-ons.   Final    Auto  resulted.   • Extra Tube 12/13/2021 hold for add-on   Final    Auto resulted   • Extra Tube 12/13/2021 hold for add-on   Final    Auto resulted   • WBC 12/13/2021 5.27  3.40 - 10.80 10*3/mm3 Final   • RBC 12/13/2021 4.51  3.77 - 5.28 10*6/mm3 Final   • Hemoglobin 12/13/2021 12.4  12.0 - 15.9 g/dL Final   • Hematocrit 12/13/2021 38.8  34.0 - 46.6 % Final   • MCV 12/13/2021 86.0  79.0 - 97.0 fL Final   • MCH 12/13/2021 27.5  26.6 - 33.0 pg Final   • MCHC 12/13/2021 32.0  31.5 - 35.7 g/dL Final   • RDW 12/13/2021 12.6  12.3 - 15.4 % Final   • RDW-SD 12/13/2021 39.3  37.0 - 54.0 fl Final   • MPV 12/13/2021 8.3  6.0 - 12.0 fL Final   • Platelets 12/13/2021 320  140 - 450 10*3/mm3 Final   • Neutrophil % 12/13/2021 55.9  42.7 - 76.0 % Final   • Lymphocyte % 12/13/2021 29.8  19.6 - 45.3 % Final   • Monocyte % 12/13/2021 7.6  5.0 - 12.0 % Final   • Eosinophil % 12/13/2021 5.5  0.3 - 6.2 % Final   • Basophil % 12/13/2021 0.8  0.0 - 1.5 % Final   • Immature Grans % 12/13/2021 0.4  0.0 - 0.5 % Final   • Neutrophils, Absolute 12/13/2021 2.95  1.70 - 7.00 10*3/mm3 Final   • Lymphocytes, Absolute 12/13/2021 1.57  0.70 - 3.10 10*3/mm3 Final   • Monocytes, Absolute 12/13/2021 0.40  0.10 - 0.90 10*3/mm3 Final   • Eosinophils, Absolute 12/13/2021 0.29  0.00 - 0.40 10*3/mm3 Final   • Basophils, Absolute 12/13/2021 0.04  0.00 - 0.20 10*3/mm3 Final   • Immature Grans, Absolute 12/13/2021 0.02  0.00 - 0.05 10*3/mm3 Final   • nRBC 12/13/2021 0.0  0.0 - 0.2 /100 WBC Final       Mental Status Exam:   Hygiene:   good  Cooperation:  Cooperative  Eye Contact:  Good  Psychomotor Behavior:  Appropriate  Mood:within normal limits  Affect:  Appropriate  Hopelessness: Denies  Speech:  Normal  Thought Process:  Goal directed  Thought Content:  Normal  Suicidal:  None  Homicidal:  None  Hallucinations:  None  Delusion:  None  Memory:  Intact  Orientation:  Person, Place, Time and Situation  Reliability:  good  Insight:  Good  Judgement:  Good  Impulse  Control:  Good  Physical/Medical Issues:  No     PHQ-9 Depression Screening  Little interest or pleasure in doing things? 1-->several days   Feeling down, depressed, or hopeless? 0-->not at all   Trouble falling or staying asleep, or sleeping too much? 3-->nearly every day (Trouble falling asleep, sleeping too much)   Feeling tired or having little energy? 1-->several days   Poor appetite or overeating? 0-->not at all   Feeling bad about yourself - or that you are a failure or have let yourself or your family down? 1-->several days   Trouble concentrating on things, such as reading the newspaper or watching television? 0-->not at all   Moving or speaking so slowly that other people could have noticed? Or the opposite - being so fidgety or restless that you have been moving around a lot more than usual? 0-->not at all   Thoughts that you would be better off dead, or of hurting yourself in some way? 0-->not at all   PHQ-9 Total Score 6   If you checked off any problems, how difficult have these problems made it for you to do your work, take care of things at home, or get along with other people? very difficult        Assessment/Plan:  Diagnoses and all orders for this visit:    1. Bipolar 2 disorder (HCC) (Primary)  -     Lithium level; Future  -     Basic metabolic panel; Future  -     T4; Future  -     TSH; Future  -     lithium (ESKALITH) 450 MG CR tablet; Take 1 tablet by mouth Every Night.  Dispense: 30 tablet; Refill: 3    2. Anxiety  -     sertraline (ZOLOFT) 100 MG tablet; Take 1 tablet by mouth Daily.  Dispense: 30 tablet; Refill: 6    3. Psychophysiological insomnia  -     amitriptyline (ELAVIL) 25 MG tablet; Take 1 tablet by mouth Every Night.  Dispense: 30 tablet; Refill: 3      We will get a lithium panel updated for this year.  We will continue lithium and Zoloft.  We will start amitriptyline 25 mg at bedtime.    A psychological evaluation was conducted in order to assess past and current level of  functioning. Areas assessed included, but were not limited to: perception of social support, perception of ability to face and deal with challenges in life (positive functioning), anxiety symptoms, depressive symptoms, perspective on beliefs/belief system, coping skills for stress, intelligence level,  Therapeutic rapport was established. Interventions conducted today were geared towards incorporating medication management along with support for continued therapy. Education was also provided as to the med management with this provider and what to expect in subsequent sessions.    We discussed risks, benefits,goals and side effects of the above medication and the patient was agreeable with the plan.Patient was educated on the importance of compliance with treatment and follow-up appointments. Patient is aware to contact the [facility] with any worsening of symptoms. To call for questions or concerns and return early if necessary. Patent is agreeable to go to the Emergency Department or call 911 should they begin SI/HI.     Treatment Plan:   Discussed risks, benefits, and alternatives of medication. Encouraged healthy habits (eating, exercise and sleep). Call if any questions or problems arise. Medication reconciled. Controlled substance monitoring report reviewed. Provided psychoeducation.. Discussed coping strategies and current stressors. Set appropriate boundaries and limits for patient's well-being. Use distraction techniques to improve symptoms. Access support networks.      Return in about 4 weeks (around 5/3/2022) for Video visit, Follow Up 30 min.    RITESH Sherwood

## 2022-04-27 ENCOUNTER — OFFICE VISIT (OUTPATIENT)
Dept: PSYCHIATRY | Facility: CLINIC | Age: 22
End: 2022-04-27

## 2022-04-27 DIAGNOSIS — F31.81 BIPOLAR II DISORDER: Primary | ICD-10-CM

## 2022-04-27 DIAGNOSIS — F40.10 SOCIAL ANXIETY DISORDER: ICD-10-CM

## 2022-04-27 PROCEDURE — 90837 PSYTX W PT 60 MINUTES: CPT | Performed by: SOCIAL WORKER

## 2022-04-27 NOTE — PSYCHOTHERAPY NOTE
Patient is considering have top surgery and discussed concerns regarding disclosing gender identity and sexual orientation to parents.   Provided contact information for [facility] clinic for information.

## 2022-04-27 NOTE — PROGRESS NOTES
"Date: 04/27/2022  Time In: 1225  Time Out: 1323    PROGRESS NOTE  Data:  Rhona Diaz is a 21 y.o. female who presents today for individual therapy session at [facility].     ICD-10-CM ICD-9-CM   1. Bipolar II disorder (HCC)  F31.81 296.89   2. Social anxiety disorder  F40.10 300.23     Patient reports feeling \"pretty good\" today. Patient reports deciding not to get tested for Autism but reports connecting with supports that have similar experiences.. Patient reports sleep problems have resolved at this time and reports deciding not to take the sleep medication from medication provider. Patient discussed plans for future regarding relationship and is hopeful about career. Patient discussed concerns regarding disclosing personal information to parents. Patient discussed having some thoughts about self harm when feeling overwhelmed a day with parents. Patient reports they were able to take a nap and have some water and thoughts dissipated.  Patient denies SI. Patient goal \"contact the healthcare clinic at \".    Clinical Maneuvering/Intervention:    (Scales based on 0 - 10 with 10 being the worst)  Depression:   na Anxiety:  na       Assisted patient in processing above session content; acknowledged and normalized patient’s thoughts, feelings, and concerns.  Discussed core beliefs and how core beliefs can influence how we see ourselves, other people and the world. Discussed the importance of positive self talk when challenging negative thoughts about self. Discussed the importance of challenging anxious thoughts when in social situations.  Discussed triggers associated with patient's anxiety.  Also discussed coping skills for patient to implement such as grounding exercises, breathing and progressive muscle relaxation. Discussed writing as a therapeutic tool for decision making and reflecting. Discussed the option to establish boundaries with parents to help with the relationship when disclosing " personal information.    Allowed patient to freely discuss issues without interruption or judgment. Provided safe, confidential environment to facilitate the development of positive therapeutic relationship and encourage open, honest communication. Assisted patient in identifying risk factors which would indicate the need for higher level of care including thoughts to harm self or others and/or self-harming behavior and encouraged patient to contact this office, call 911, or present to the nearest emergency room should any of these events occur. Discussed crisis intervention services and means to access. Patient adamantly and convincingly denies current suicidal or homicidal ideation or perceptual disturbance.    Assessment   Patient appears to maintain relative stability as compared to their baseline.  However, patient continues to struggle with anxiety which continues to cause impairment in important areas of functioning.  As a result, they can be reasonably expected to continue to benefit from treatment and would likely be at increased risk for decompensation otherwise.    Mental Status Exam:   Hygiene:   good  Cooperation:  Cooperative  Eye Contact:  Fair  Psychomotor Behavior:  Appropriate  Affect:  Appropriate  Mood: normal  Speech:  Normal  Thought Process:  Goal directed  Thought Content:  Normal  Suicidal:  None  Homicidal:  None  Hallucinations:  None  Delusion:  None  Memory:  Intact  Orientation:  Grossly intact  Reliability:  good  Insight:  Fair  Judgement:  Fair  Impulse Control:  Good  Physical/Medical Issues:  No      Patient's Support Network Includes:  significant other and parents    Functional Status: Mild impairment     Progress toward goal: Not at goal    Prognosis: Good with Ongoing Treatment          Plan     Patient will continue in individual outpatient therapy with focus on improved functioning and coping skills, maintaining stability, and avoiding decompensation and the need for higher  level of care.    Patient will adhere to medication regimen as prescribed and report any side effects. Patient will contact this office, call 911 or present to the nearest emergency room should suicidal or homicidal ideations occur. Provide Cognitive Behavioral Therapy and Solution Focused Therapy to improve functioning, maintain stability, and avoid decompensation and the need for higher level of care.     Return in about Return in about 4 weeks (around 5/25/2022). or earlier if symptoms worsen or fail to improve.            This document has been electronically signed by Judy Mayo LCSW  April 27, 2022 12:22 EDT      Part of this note may be an electronic transcription/translation of spoken language to printed text using the Dragon Dictation System.

## 2022-05-08 ENCOUNTER — HOSPITAL ENCOUNTER (EMERGENCY)
Facility: HOSPITAL | Age: 22
Discharge: HOME | End: 2022-05-08
Attending: EMERGENCY MEDICINE | Admitting: EMERGENCY MEDICINE

## 2022-05-08 ENCOUNTER — APPOINTMENT (OUTPATIENT)
Dept: GENERAL RADIOLOGY | Facility: HOSPITAL | Age: 22
End: 2022-05-08

## 2022-05-08 VITALS
HEIGHT: 67 IN | SYSTOLIC BLOOD PRESSURE: 134 MMHG | WEIGHT: 184.4 LBS | TEMPERATURE: 98.1 F | BODY MASS INDEX: 28.94 KG/M2 | OXYGEN SATURATION: 98 % | HEART RATE: 80 BPM | RESPIRATION RATE: 18 BRPM | DIASTOLIC BLOOD PRESSURE: 90 MMHG

## 2022-05-08 DIAGNOSIS — S30.0XXA CONTUSION OF COCCYX, INITIAL ENCOUNTER: Primary | ICD-10-CM

## 2022-05-08 DIAGNOSIS — S30.0XXA CONTUSION OF BUTTOCK, INITIAL ENCOUNTER: ICD-10-CM

## 2022-05-08 PROCEDURE — 72170 X-RAY EXAM OF PELVIS: CPT

## 2022-05-08 PROCEDURE — 99282 EMERGENCY DEPT VISIT SF MDM: CPT

## 2022-05-08 PROCEDURE — 72220 X-RAY EXAM SACRUM TAILBONE: CPT

## 2022-05-08 NOTE — ED PROVIDER NOTES
Subjective   Chief Complaint: tailbone pain  History of Present Illness: 21-year-old female comes in for evaluation above complaint.  Slipped and fell landing on her buttock slid down 3 stairs.  No neck thoracic or lumbar pain.  Pain is at the top of the gluteal cleft near the coccyx and sacrum with some ecchymosis in the left gluteus atif.  No difficulty with bearing weight.  No pelvic or hip pain.  Some soreness to the bilateral abdominal musculature however no abdominal tenderness.  Onset: 3 days ago  Timing: Single inciting injury with ongoing pain  Exacerbating / Alleviating factors: With palpation of the coccyx and sitting  Associated symptoms: None      Nurses Notes reviewed and agree, including vitals, allergies, social history and prior medical history.          Review of Systems   Constitutional: Negative.    HENT: Negative.    Eyes: Negative.    Respiratory: Negative.    Cardiovascular: Negative.    Gastrointestinal: Negative.    Genitourinary: Negative.    Musculoskeletal:        Coccyx pain   Skin: Negative.    Neurological: Negative.    Psychiatric/Behavioral: Negative.        Past Medical History:   Diagnosis Date   • Anxiety    • Depression    • Dizziness 2015   • Environmental allergies    • Migraine 2019   • Multiple allergies    • Nosebleed    • Ringing in ears    • Seasonal allergies    • Sinus problem    • Snores        Allergies   Allergen Reactions   • Cat Hair Extract Hives     Itching, hives, rash    • Nuts Anaphylaxis   • Shellfish-Derived Products Anaphylaxis   • Tree Nut Anaphylaxis       Past Surgical History:   Procedure Laterality Date   • NO PAST SURGERIES         Family History   Problem Relation Age of Onset   • No Known Problems Mother    • No Known Problems Father    • No Known Problems Sister    • No Known Problems Brother    • No Known Problems Son    • No Known Problems Daughter    • Heart disease Maternal Grandmother    • No Known Problems Maternal Grandfather    • Heart  disease Paternal Grandmother    • Arthritis Paternal Grandmother    • No Known Problems Paternal Grandfather    • No Known Problems Cousin    • Lung cancer Other    • Skin cancer Other    • Diabetes Other    • Heart attack Other    • Hypertension Other    • Hyperlipidemia Other    • Mental illness Other    • Stroke Other    • Rheum arthritis Neg Hx    • Osteoarthritis Neg Hx    • Asthma Neg Hx    • Heart failure Neg Hx    • Migraines Neg Hx    • Rashes / Skin problems Neg Hx    • Seizures Neg Hx    • Thyroid disease Neg Hx    • Colon cancer Neg Hx    • Cirrhosis Neg Hx    • Liver disease Neg Hx    • Liver cancer Neg Hx    • Crohn's disease Neg Hx    • Ulcerative colitis Neg Hx    • Esophageal cancer Neg Hx    • Stomach cancer Neg Hx        Social History     Socioeconomic History   • Marital status: Single   Tobacco Use   • Smoking status: Never Smoker   • Smokeless tobacco: Never Used   Substance and Sexual Activity   • Alcohol use: No   • Drug use: No   • Sexual activity: Defer           Objective   Physical Exam  Vitals and nursing note reviewed.   Constitutional:       General: She is not in acute distress.     Appearance: Normal appearance. She is not ill-appearing, toxic-appearing or diaphoretic.   HENT:      Head: Normocephalic and atraumatic.      Nose: Nose normal.   Eyes:      Extraocular Movements: Extraocular movements intact.   Cardiovascular:      Rate and Rhythm: Normal rate.   Pulmonary:      Effort: Pulmonary effort is normal.   Abdominal:      General: Abdomen is flat.      Palpations: Abdomen is soft.      Tenderness: There is no abdominal tenderness.   Musculoskeletal:         General: Normal range of motion.      Cervical back: Normal range of motion. No tenderness.        Back:       Comments: In addition to the time of the gluteal cleft and coccyx area   Skin:     General: Skin is warm and dry.             Comments: Linear area of ecchymosis to the left gluteus atif   Neurological:       General: No focal deficit present.      Mental Status: She is alert. Mental status is at baseline.   Psychiatric:         Mood and Affect: Mood normal.         Behavior: Behavior normal.         Procedures           ED Course                                                 MDM    Final diagnoses:   Contusion of coccyx, initial encounter   Contusion of buttock, initial encounter       ED Disposition  ED Disposition     ED Disposition   Discharge    Condition   Stable    Comment   --             Mary Bowman MD  [address]  [facility] [address]  [phone]      As needed    [facility] Emergency Department  [address] [address] [phone]    If symptoms worsen         Medication List      No changes were made to your prescriptions during this visit.          Magan Weaver PA-C  05/08/22 1601

## 2022-05-11 ENCOUNTER — OFFICE VISIT (OUTPATIENT)
Dept: PSYCHIATRY | Facility: CLINIC | Age: 22
End: 2022-05-11

## 2022-05-11 DIAGNOSIS — F40.10 SOCIAL ANXIETY DISORDER: ICD-10-CM

## 2022-05-11 DIAGNOSIS — F31.81 BIPOLAR II DISORDER: Primary | ICD-10-CM

## 2022-05-11 PROCEDURE — 90837 PSYTX W PT 60 MINUTES: CPT | Performed by: SOCIAL WORKER

## 2022-05-11 NOTE — PROGRESS NOTES
"Date: 05/11/2022  Time In: 1232  Time Out:  1331      PROGRESS NOTE  Data:  Rhona Diaz is a 21 y.o. female who presents today for individual therapy session at [facility].     ICD-10-CM ICD-9-CM   1. Bipolar II disorder (HCC)  F31.81 296.89   2. Social anxiety disorder  F40.10 300.23     Patient discussed falling at work and having to go to the emergency room for an xray. Patient reports having a bad experience and reports feeling they did not get adequate help or support from staff. Patient discussed relationship with a close friend and reports they are trying to work on communication and boundaries with this friend. Patient reports feeling they are not on the \"same page\" regarding their views about when to disclose personal information. 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 Patient discussed having some thoughts about self harm after leaving the emergency room. Patient reports they utilized a squeeze ball to manage urges and contacted her supports.     Clinical Maneuvering/Intervention:    (Scales based on 0 - 10 with 10 being the worst)  Depression:   na Anxiety:  na       Assisted patient in processing above session content; acknowledged and normalized patient’s thoughts, feelings, and concerns.  Discussed core beliefs and how core beliefs can influence how we see ourselves, other people and the world. Discussed the importance of positive self talk when challenging negative thoughts about self. Discussed the importance of challenging anxious thoughts when in social situations.  Discussed triggers associated with patient's anxiety.  Also discussed coping skills for patient to implement such as grounding exercises, breathing and progressive muscle relaxation. Discussed writing as a therapeutic tool for decision making and reflecting. Discussed the option to establish boundaries with parents to help with the " relationship when disclosing personal information.    Allowed patient to freely discuss issues without interruption or judgment. Provided safe, confidential environment to facilitate the development of positive therapeutic relationship and encourage open, honest communication. Assisted patient in identifying risk factors which would indicate the need for higher level of care including thoughts to harm self or others and/or self-harming behavior and encouraged patient to contact this office, call 911, or present to the nearest emergency room should any of these events occur. Discussed crisis intervention services and means to access. Patient adamantly and convincingly denies current suicidal or homicidal ideation or perceptual disturbance.    Assessment   Patient appears to maintain relative stability as compared to their baseline.  However, patient continues to struggle with anxiety which continues to cause impairment in important areas of functioning.  As a result, they can be reasonably expected to continue to benefit from treatment and would likely be at increased risk for decompensation otherwise.    Mental Status Exam:   Hygiene:   good  Cooperation:  Cooperative  Eye Contact:  Fair  Psychomotor Behavior:  Appropriate  Affect:  Appropriate  Mood: normal  Speech:  Normal  Thought Process:  Goal directed  Thought Content:  Normal  Suicidal:  None  Homicidal:  None  Hallucinations:  None  Delusion:  None  Memory:  Intact  Orientation:  Grossly intact  Reliability:  good  Insight:  Good  Judgement:  Fair  Impulse Control:  Good  Physical/Medical Issues:  No      Patient's Support Network Includes:  significant other and parents    Functional Status: No impairment    Progress toward goal: Not at goal    Prognosis: Good with Ongoing Treatment          Plan     Patient will continue in individual outpatient therapy with focus on improved functioning and coping skills, maintaining stability, and avoiding decompensation  and the need for higher level of care.    Patient will adhere to medication regimen as prescribed and report any side effects. Patient will contact this office, call 911 or present to the nearest emergency room should suicidal or homicidal ideations occur. Provide Cognitive Behavioral Therapy and Solution Focused Therapy to improve functioning, maintain stability, and avoid decompensation and the need for higher level of care.     Return in about Return in about 4 weeks (around 6/8/2022). or earlier if symptoms worsen or fail to improve.            This document has been electronically signed by Judy Mayo LCSW  May 11, 2022 12:31 EDT      Part of this note may be an electronic transcription/translation of spoken language to printed text using the Dragon Dictation System.

## 2022-05-16 ENCOUNTER — OFFICE VISIT (OUTPATIENT)
Dept: INTERNAL MEDICINE | Facility: CLINIC | Age: 22
End: 2022-05-16

## 2022-05-16 VITALS
HEART RATE: 83 BPM | HEIGHT: 67 IN | SYSTOLIC BLOOD PRESSURE: 110 MMHG | WEIGHT: 182 LBS | BODY MASS INDEX: 28.56 KG/M2 | OXYGEN SATURATION: 99 % | TEMPERATURE: 98.5 F | DIASTOLIC BLOOD PRESSURE: 70 MMHG

## 2022-05-16 DIAGNOSIS — N94.6 DYSMENORRHEA: Primary | ICD-10-CM

## 2022-05-16 LAB
B-HCG UR QL: NEGATIVE
EXPIRATION DATE: NORMAL
INTERNAL NEGATIVE CONTROL: NORMAL
INTERNAL POSITIVE CONTROL: NORMAL
Lab: NORMAL

## 2022-05-16 PROCEDURE — 81025 URINE PREGNANCY TEST: CPT | Performed by: NURSE PRACTITIONER

## 2022-05-16 PROCEDURE — 99214 OFFICE O/P EST MOD 30 MIN: CPT | Performed by: NURSE PRACTITIONER

## 2022-05-16 RX ORDER — DROSPIRENONE AND ETHINYL ESTRADIOL 0.02-3(28)
1 KIT ORAL DAILY
Qty: 28 TABLET | Refills: 11 | Status: SHIPPED | OUTPATIENT
Start: 2022-05-16

## 2022-05-16 NOTE — PROGRESS NOTES
Office Visit      Patient Name: Rhona Diaz  : 2000   MRN: [id number]     Chief Complaint:    Chief Complaint   Patient presents with   • Follow-up     Birth control and hormonal acne       History of Present Illness: Rhona Diaz is a 21 y.o. female who is here today with request for birth control.  In a homosexual relationship, prefers to be referred to as they, identifies as queer.  Requesting hormonal birth control due to painful acne, worse with menstruation.  Went to dermatology, prescribed antibiotics for acne which were not effective.  Menstrual periods are painful & heavy. For the first 3 days, pain is worse with light bleeding.  When pain decreases, bleeding increases with occasional blood clots.  Changes super plus tampons every 3-4 hours with heavy bleeding.  Heavy bleeding lasts 2-3 days. Started taking birth control at age 12. Worked well for menstrual cramps, heavy bleeding. Stopped taking it, didn't feel it was healthy to take long term.  Takes ibuprofen the entire time they are menstruating.  Has not had a pap smear.    Uses benzoyl peroxide and keeps face and hands clean.    Subjective      I have reviewed and the following portions of the patient's history were updated as appropriate: past family history, past medical history, past social history, past surgical history and problem list.      Current Outpatient Medications:   •  amitriptyline (ELAVIL) 25 MG tablet, Take 1 tablet by mouth Every Night., Disp: 30 tablet, Rfl: 3  •  lithium (ESKALITH) 450 MG CR tablet, Take 1 tablet by mouth Every Night., Disp: 30 tablet, Rfl: 3  •  sertraline (ZOLOFT) 100 MG tablet, Take 1 tablet by mouth Daily., Disp: 30 tablet, Rfl: 6  •  drospirenone-ethinyl estradiol (STERLING,GIANVI) 3-0.02 MG per tablet, Take 1 tablet by mouth Daily., Disp: 28 tablet, Rfl: 11  •  propranolol (INDERAL) 10 MG tablet, Take 1 tablet by mouth 3 (Three) Times a Day As Needed (for anxiety). MUST BE SEEN FOR ADDITIONAL REFILLS,  "Disp: 90 tablet, Rfl: 0    Allergies   Allergen Reactions   • Cat Hair Extract Hives     Itching, hives, rash    • Nuts Anaphylaxis   • Shellfish-Derived Products Anaphylaxis   • Tree Nut Anaphylaxis       Objective     Physical Exam:  Vital Signs:   Vitals:    05/16/22 1551   BP: 110/70   Pulse: 83   Temp: 98.5 °F (36.9 °C)   SpO2: 99%   Weight: 82.6 kg (182 lb)   Height: 170.2 cm (67.01\")     Body mass index is 28.5 kg/m².    Physical Exam  Constitutional:       Appearance: She is not ill-appearing.   HENT:      Head: Normocephalic.      Right Ear: External ear normal.      Left Ear: External ear normal.   Eyes:      Conjunctiva/sclera: Conjunctivae normal.      Pupils: Pupils are equal, round, and reactive to light.   Cardiovascular:      Rate and Rhythm: Normal rate and regular rhythm.      Pulses:           Radial pulses are 2+ on the right side and 2+ on the left side.        Dorsalis pedis pulses are 2+ on the right side and 2+ on the left side.      Heart sounds: Normal heart sounds.   Pulmonary:      Effort: Pulmonary effort is normal.      Breath sounds: Normal breath sounds.   Musculoskeletal:      Cervical back: Normal range of motion and neck supple.      Right lower leg: No edema.      Left lower leg: No edema.   Skin:     General: Skin is warm.      Capillary Refill: Capillary refill takes less than 2 seconds.      Coloration: Skin is not pale.   Neurological:      Mental Status: She is alert and oriented to person, place, and time.      Coordination: Coordination normal.      Gait: Gait normal.   Psychiatric:         Mood and Affect: Mood normal.         Behavior: Behavior normal.         Thought Content: Thought content normal.       Office Visit on 05/16/2022   Component Date Value Ref Range Status   • HCG, Urine, QL 05/16/2022 Negative  Negative Final   • Lot Number 05/16/2022 [id number]   Final   • Internal Positive Control 05/16/2022 Passed  Positive, Passed Final   • Internal Negative Control " 05/16/2022 Passed  Negative, Passed Final   • Expiration Date 05/16/2022 73,[id number]   Final        Assessment / Plan      Assessment/Plan:   Diagnoses and all orders for this visit:    1. Dysmenorrhea (Primary)  -     drospirenone-ethinyl estradiol (STERLING,GIANVI) 3-0.02 MG per tablet; Take 1 tablet by mouth Daily.  Dispense: 28 tablet; Refill: 11.  Discussed potential adverse effects, including blood clots.  They make informed decision to take this medication.    -     Ambulatory Referral to Gynecology  -     POCT pregnancy, urine             Follow Up:   Return for Annual.    Patient was given instructions and counseling regarding her condition or for health maintenance advice. Please see specific information pulled into the AVS if appropriate.       [facility]     Please note that portions of this note may have been completed with a voice recognition program. Efforts were made to edit dictation, but occasionally words are mistranscribed.

## 2022-05-23 DIAGNOSIS — F41.1 GENERALIZED ANXIETY DISORDER: ICD-10-CM

## 2022-05-24 RX ORDER — PROPRANOLOL HYDROCHLORIDE 10 MG/1
10 TABLET ORAL 3 TIMES DAILY PRN
Qty: 90 TABLET | Refills: 0 | Status: SHIPPED | OUTPATIENT
Start: 2022-05-24 | End: 2022-06-27

## 2022-05-24 NOTE — TELEPHONE ENCOUNTER
Rx Refill Note  Requested Prescriptions     Pending Prescriptions Disp Refills   • propranolol (INDERAL) 10 MG tablet 90 tablet 0     Sig: Take 1 tablet by mouth 3 (Three) Times a Day As Needed (for anxiety). MUST BE SEEN FOR ADDITIONAL REFILLS      Last office visit with prescribing clinician: 4/5/2022      Next office visit with prescribing clinician: Visit date not found            Melany Ramsey LPN  05/24/22, 11:15 EDT

## 2022-06-27 DIAGNOSIS — F41.1 GENERALIZED ANXIETY DISORDER: ICD-10-CM

## 2022-06-27 RX ORDER — PROPRANOLOL HYDROCHLORIDE 10 MG/1
TABLET ORAL
Qty: 90 TABLET | Refills: 0 | Status: SHIPPED | OUTPATIENT
Start: 2022-06-27 | End: 2022-07-27

## 2022-07-13 ENCOUNTER — OFFICE VISIT (OUTPATIENT)
Dept: PSYCHIATRY | Facility: CLINIC | Age: 22
End: 2022-07-13

## 2022-07-13 DIAGNOSIS — F31.81 BIPOLAR II DISORDER: Primary | ICD-10-CM

## 2022-07-13 DIAGNOSIS — F40.10 SOCIAL ANXIETY DISORDER: ICD-10-CM

## 2022-07-13 PROCEDURE — 90837 PSYTX W PT 60 MINUTES: CPT | Performed by: SOCIAL WORKER

## 2022-07-13 NOTE — PROGRESS NOTES
Date: 07/13/2022   Time In: 1100  Time Out: 1157    PROGRESS NOTE  Data:  Rhona Diaz is a 22 y.o. female who presents today for individual therapy session at [facility].     ICD-10-CM ICD-9-CM   1. Bipolar II disorder (HCC)  F31.81 296.89   2. Social anxiety disorder  F40.10 300.23     Patient discussed a recent conversation they had with their mother regarding their beliefs. Patient reports feeling hopeful for their relationship in the future. They discussed partner's possible move to [place] and reports being supportive of partner's decision. They discussed anxiety regarding being in public and discussed examples of being over stimulated. They report that when overstimulated they engage in behaviors such as tugging at ear and scratching or pulling hair. Patient reports partner is helpful for reducing these behaviors as well as using ear plugs and a squeeze ball.         Clinical Maneuvering/Intervention:    (Scales based on 0 - 10 with 10 being the worst)  Depression:   na Anxiety:  na       Assisted patient in processing above session content; acknowledged and normalized patient’s thoughts, feelings, and concerns.  Discussed triggers associated with patient's anxiety  Also discussed coping skills for patient to implement such as using other objects similar to the squeeze ball such as a fidget toy.     Allowed patient to freely discuss issues without interruption or judgment. Provided safe, confidential environment to facilitate the development of positive therapeutic relationship and encourage open, honest communication. Assisted patient in identifying risk factors which would indicate the need for higher level of care including thoughts to harm self or others and/or self-harming behavior and encouraged patient to contact this office, call 911, or present to the nearest emergency room should any of these events occur. Discussed crisis intervention services and means to access. Patient  adamantly and convincingly denies current suicidal or homicidal ideation or perceptual disturbance.    Assessment   Patient appears to maintain relative stability as compared to their baseline.  However, patient continues to struggle with mood instability and social anxiety which continues to cause impairment in important areas of functioning.  As a result, they can be reasonably expected to continue to benefit from treatment and would likely be at increased risk for decompensation otherwise.    Mental Status Exam:   Hygiene:   good  Cooperation:  Cooperative  Eye Contact:  Good  Psychomotor Behavior:  Appropriate  Affect:  Appropriate  Mood: normal  Speech:  Normal  Thought Process:  Goal directed  Thought Content:  Normal  Suicidal:  None  Homicidal:  None  Hallucinations:  None  Delusion:  None  Memory:  Intact  Orientation:  Person, Place, Time and Situation  Reliability:  good  Insight:  Good  Judgement:  Good  Impulse Control:  Fair  Physical/Medical Issues:  No      Patient's Support Network Includes:  significant other and mother    Functional Status: Mild impairment     Progress toward goal: Not at goal    Prognosis: Good with Ongoing Treatment          Plan     Patient will continue in individual outpatient therapy with focus on improved functioning and coping skills, maintaining stability, and avoiding decompensation and the need for higher level of care.    Patient will adhere to medication regimen as prescribed and report any side effects. Patient will contact this office, call 911 or present to the nearest emergency room should suicidal or homicidal ideations occur. Provide Cognitive Behavioral Therapy and Solution Focused Therapy to improve functioning, maintain stability, and avoid decompensation and the need for higher level of care.     Return in about Return in about 2 weeks (around 7/27/2022). or earlier if symptoms worsen or fail to improve.            This document has been electronically  signed by Judy Mayo LCSW  July 13, 2022 10:59 EDT      Part of this note may be an electronic transcription/translation of spoken language to printed text using the Dragon Dictation System.

## 2022-07-27 DIAGNOSIS — F41.1 GENERALIZED ANXIETY DISORDER: ICD-10-CM

## 2022-07-27 RX ORDER — PROPRANOLOL HYDROCHLORIDE 10 MG/1
TABLET ORAL
Qty: 90 TABLET | Refills: 0 | Status: SHIPPED | OUTPATIENT
Start: 2022-07-27 | End: 2022-08-10 | Stop reason: SDUPTHER

## 2022-07-28 DIAGNOSIS — F31.81 BIPOLAR 2 DISORDER: ICD-10-CM

## 2022-07-28 RX ORDER — LITHIUM CARBONATE 450 MG
450 TABLET, EXTENDED RELEASE ORAL NIGHTLY
Qty: 30 TABLET | Refills: 3 | OUTPATIENT
Start: 2022-07-28

## 2022-08-01 DIAGNOSIS — F31.81 BIPOLAR 2 DISORDER: ICD-10-CM

## 2022-08-01 RX ORDER — LITHIUM CARBONATE 450 MG
450 TABLET, EXTENDED RELEASE ORAL NIGHTLY
Qty: 30 TABLET | Refills: 0 | Status: SHIPPED | OUTPATIENT
Start: 2022-08-01 | End: 2022-08-10 | Stop reason: SDUPTHER

## 2022-08-03 ENCOUNTER — OFFICE VISIT (OUTPATIENT)
Dept: INTERNAL MEDICINE | Facility: CLINIC | Age: 22
End: 2022-08-03

## 2022-08-03 VITALS
TEMPERATURE: 97.1 F | DIASTOLIC BLOOD PRESSURE: 64 MMHG | BODY MASS INDEX: 28.72 KG/M2 | HEIGHT: 67 IN | WEIGHT: 183 LBS | SYSTOLIC BLOOD PRESSURE: 102 MMHG | HEART RATE: 82 BPM | OXYGEN SATURATION: 97 %

## 2022-08-03 DIAGNOSIS — L70.9 ACNE, UNSPECIFIED ACNE TYPE: Primary | ICD-10-CM

## 2022-08-03 PROCEDURE — 99213 OFFICE O/P EST LOW 20 MIN: CPT | Performed by: NURSE PRACTITIONER

## 2022-08-03 RX ORDER — FLUCONAZOLE 150 MG/1
150 TABLET ORAL
Qty: 2 TABLET | Refills: 0 | Status: SHIPPED | OUTPATIENT
Start: 2022-08-03 | End: 2022-08-09

## 2022-08-03 NOTE — PROGRESS NOTES
Office Visit      Patient Name: Rhona Diaz  : 2000   MRN: [id number]     Chief Complaint:    Chief Complaint   Patient presents with   • Acne       History of Present Illness: Rhona Diaz is a 22 y.o. female who is here today with acne.  Alejandro prefers to be referred to as they/them.  Alejandro's mom has fungal acne, recommended anti-fungal cream as their acne resembles their mom's.  Alejandro used OTC antifungal cream for a brief time, acne did improve.  Has changed brand of masks, changes at least once a day.  Does not wear a mask when not necessary.  Acne is worse beneath the area covered by a mask.  Has been washing face in the morning and evening.  Wash hands before touching face.  Has not noticed any food triggers.  No change in personal or household products.    Subjective      I have reviewed and the following portions of the patient's history were updated as appropriate: past family history, past medical history, past social history, past surgical history and problem list.      Current Outpatient Medications:   •  drospirenone-ethinyl estradiol (STERLING,GIANVI) 3-0.02 MG per tablet, Take 1 tablet by mouth Daily., Disp: 28 tablet, Rfl: 11  •  ondansetron ODT (ZOFRAN-ODT) 4 MG disintegrating tablet, Place 1 tablet under the tongue Every 8 (Eight) Hours As Needed for Nausea or Vomiting., Disp: 15 tablet, Rfl: 0  •  lithium (ESKALITH) 450 MG CR tablet, Take 1 tablet by mouth Every Night., Disp: 30 tablet, Rfl: 2  •  propranolol (INDERAL) 10 MG tablet, Take 1 tablet by mouth 3 (Three) Times a Day As Needed (nxiety/sleep/panic). for anxiety, Disp: 90 tablet, Rfl: 2  •  sertraline (ZOLOFT) 100 MG tablet, Take 1 tablet by mouth Daily., Disp: 30 tablet, Rfl: 2    Allergies   Allergen Reactions   • Cat Hair Extract Hives     Itching, hives, rash    • Nuts Anaphylaxis   • Shellfish-Derived Products Anaphylaxis   • Tree Nut Anaphylaxis       Objective     Physical Exam:  Vital Signs:   Vitals:    22 1532  "  BP: 102/64   Pulse: 82   Temp: 97.1 °F (36.2 °C)   SpO2: 97%   Weight: 83 kg (183 lb)   Height: 170.2 cm (67.01\")     Body mass index is 28.66 kg/m².    Physical Exam  Constitutional:       Appearance: She is not ill-appearing.   HENT:      Head: Normocephalic.      Right Ear: External ear normal.      Left Ear: External ear normal.   Eyes:      Conjunctiva/sclera: Conjunctivae normal.      Pupils: Pupils are equal, round, and reactive to light.   Cardiovascular:      Rate and Rhythm: Normal rate and regular rhythm.      Pulses:           Radial pulses are 2+ on the right side and 2+ on the left side.        Dorsalis pedis pulses are 2+ on the right side and 2+ on the left side.      Heart sounds: Normal heart sounds.   Pulmonary:      Effort: Pulmonary effort is normal.      Breath sounds: Normal breath sounds.   Musculoskeletal:      Cervical back: Normal range of motion and neck supple.      Right lower leg: No edema.      Left lower leg: No edema.   Skin:     General: Skin is warm.      Capillary Refill: Capillary refill takes less than 2 seconds.      Comments: Acne scattered on bilateral cheeks, chin, small cluster on forehead.   Neurological:      Mental Status: She is alert and oriented to person, place, and time.      Coordination: Coordination normal.      Gait: Gait normal.   Psychiatric:         Mood and Affect: Mood normal.         Behavior: Behavior normal.         Thought Content: Thought content normal.             Assessment / Plan      Assessment/Plan:   Diagnoses and all orders for this visit:    1. Acne, unspecified acne type (Primary)  -     Ambulatory Referral to Dermatology  -     fluconazole (Diflucan) 150 MG tablet; Take 1 tablet by mouth Every 3 (Three) Days for 6 days.  Dispense: 2 tablet; Refill: 0    Follow Up:   Return if symptoms worsen or fail to improve.    Patient was given instructions and counseling regarding her condition or for health maintenance advice. Please see specific " information pulled into the AVS if appropriate.       [facility]     Please note that portions of this note may have been completed with a voice recognition program. Efforts were made to edit dictation, but occasionally words are mistranscribed.

## 2022-08-10 ENCOUNTER — OFFICE VISIT (OUTPATIENT)
Dept: BEHAVIORAL HEALTH | Facility: CLINIC | Age: 22
End: 2022-08-10

## 2022-08-10 VITALS
SYSTOLIC BLOOD PRESSURE: 122 MMHG | WEIGHT: 186.4 LBS | BODY MASS INDEX: 29.26 KG/M2 | DIASTOLIC BLOOD PRESSURE: 72 MMHG | HEIGHT: 67 IN

## 2022-08-10 DIAGNOSIS — F64.9 GENDER DYSPHORIA: ICD-10-CM

## 2022-08-10 DIAGNOSIS — F31.81 BIPOLAR 2 DISORDER: Primary | ICD-10-CM

## 2022-08-10 DIAGNOSIS — F41.1 GENERALIZED ANXIETY DISORDER: ICD-10-CM

## 2022-08-10 PROCEDURE — 99214 OFFICE O/P EST MOD 30 MIN: CPT

## 2022-08-10 RX ORDER — LITHIUM CARBONATE 450 MG
450 TABLET, EXTENDED RELEASE ORAL NIGHTLY
Qty: 30 TABLET | Refills: 2 | Status: SHIPPED | OUTPATIENT
Start: 2022-08-10 | End: 2022-10-31 | Stop reason: SDUPTHER

## 2022-08-10 RX ORDER — SERTRALINE HYDROCHLORIDE 100 MG/1
100 TABLET, FILM COATED ORAL DAILY
Qty: 30 TABLET | Refills: 2 | Status: SHIPPED | OUTPATIENT
Start: 2022-08-10 | End: 2022-12-20 | Stop reason: SDUPTHER

## 2022-08-10 RX ORDER — PROPRANOLOL HYDROCHLORIDE 10 MG/1
10 TABLET ORAL 3 TIMES DAILY PRN
Qty: 90 TABLET | Refills: 2 | Status: SHIPPED | OUTPATIENT
Start: 2022-08-10 | End: 2022-11-28

## 2022-08-10 NOTE — PROGRESS NOTES
Follow Up Office Visit    Patient Name: Rhona Diaz  : 2000   MRN: [id number]   Care Team: Patient Care Team:  Mary Bowman MD as PCP - General (Internal Medicine)  Christie Ahmadi APRN as Nurse Practitioner (Internal Medicine)  Abby Temple APRN as Nurse Practitioner (Behavioral Health)        Chief Complaint:    Chief Complaint   Patient presents with   • Anxiety   • Bipolar   • Med Management       History of Present Illness: Rhona Diaz is a 22 y.o. female who is here today for a medication management follow up. Patient states that medication continues to be effective an dis really happy with the medication regimen. Patient wanted to discuss gender dysphoria diagnosis and upcoming plans for top surgery.     Subjective   Review of Systems:    Review of Systems   All other systems reviewed and are negative.      Current Medications:   Current Outpatient Medications   Medication Sig Dispense Refill   • drospirenone-ethinyl estradiol (STERLING,GIANVI) 3-0.02 MG per tablet Take 1 tablet by mouth Daily. 28 tablet 11   • lithium (ESKALITH) 450 MG CR tablet Take 1 tablet by mouth Every Night. 30 tablet 2   • propranolol (INDERAL) 10 MG tablet Take 1 tablet by mouth 3 (Three) Times a Day As Needed (nxiety/sleep/panic). for anxiety 90 tablet 2   • sertraline (ZOLOFT) 100 MG tablet Take 1 tablet by mouth Daily. 30 tablet 2   • ondansetron ODT (ZOFRAN-ODT) 4 MG disintegrating tablet Place 1 tablet under the tongue Every 8 (Eight) Hours As Needed for Nausea or Vomiting. 15 tablet 0     No current facility-administered medications for this visit.       Mental Status Exam:   Hygiene:   good  Cooperation:  Cooperative  Eye Contact:  Good  Psychomotor Behavior:  Appropriate  Affect:  Appropriate  Mood: normal  Speech:  Normal  Thought Process:  Goal directed and Linear  Thought Content:  Normal  Suicidal:  None  Homicidal:  None  Hallucinations:  None  Delusion:  None  Memory:  Intact  Orientation:   "Person, Place, Time and Situation  Reliability:  good  Insight:  Good  Judgement:  Good  Impulse Control:  Good  Physical/Medical Issues:  No      Objective   Vital Signs:   /72   Ht 170.2 cm (67.01\")   Wt 84.6 kg (186 lb 6.4 oz)   BMI 29.19 kg/m²       Assessment / Plan    Diagnoses and all orders for this visit:    1. Bipolar 2 disorder (HCC) (Primary)  -     lithium (ESKALITH) 450 MG CR tablet; Take 1 tablet by mouth Every Night.  Dispense: 30 tablet; Refill: 2    2. Generalized anxiety disorder  -     sertraline (ZOLOFT) 100 MG tablet; Take 1 tablet by mouth Daily.  Dispense: 30 tablet; Refill: 2  -     propranolol (INDERAL) 10 MG tablet; Take 1 tablet by mouth 3 (Three) Times a Day As Needed (nxiety/sleep/panic). for anxiety  Dispense: 90 tablet; Refill: 2    3. Gender dysphoria       Will continue medication as ordered.     A psychological evaluation was conducted in order to assess past and current level of functioning. Areas assessed included, but were not limited to: perception of social support, perception of ability to face and deal with challenges in life (positive functioning), anxiety symptoms, depressive symptoms, perspective on beliefs/belief system, coping skills for stress, intelligence level,  Therapeutic rapport was established. Interventions conducted today were geared towards incorporating medication management along with support for continued therapy. Education was also provided as to the med management with this provider and what to expect in subsequent sessions.      We discussed risks, benefits, goals and side effects of the above medication and the patient was agreeable with the plan. Patient was educated on the importance of compliance with treatment and follow-up appointments. Patient is aware to contact the [facility] with any worsening of symptoms. To call for questions or concerns and return early if necessary. Patent is agreeable to go to the Emergency Department or call " 911 should they begin SI/HI.      PHQ-2/PHQ-9: Depression Screening  Little Interest or Pleasure in Doing Things: 0-->not at all  Feeling Down, Depressed or Hopeless: 0-->not at all  PHQ-2 Total Score: 0  PHQ-9: Brief Depression Severity Measure Score: 0    PHQ-9 Score:   PHQ-9 Total Score: 0     Over the last two weeks, how often have you been bothered by the following problems?  Feeling nervous, anxious or on edge: Several days  Not being able to stop or control worrying: Not at all  Worrying too much about different things: Not at all  Trouble Relaxing: Not at all  Being so restless that it is hard to sit still: Several days  Becoming easily annoyed or irritable: Several days  Feeling afraid as if something awful might happen: Several days  HAYDER 7 Total Score: 4          MEDS ORDERED DURING VISIT:  New Medications Ordered This Visit   Medications   • lithium (ESKALITH) 450 MG CR tablet     Sig: Take 1 tablet by mouth Every Night.     Dispense:  30 tablet     Refill:  2   • sertraline (ZOLOFT) 100 MG tablet     Sig: Take 1 tablet by mouth Daily.     Dispense:  30 tablet     Refill:  2   • propranolol (INDERAL) 10 MG tablet     Sig: Take 1 tablet by mouth 3 (Three) Times a Day As Needed (nxiety/sleep/panic). for anxiety     Dispense:  90 tablet     Refill:  2         Follow Up   Return in about 3 months (around 11/10/2022).  Patient was given instructions and counseling regarding her condition or for health maintenance advice. Please see specific information pulled into the AVS if appropriate.     TREATMENT PLAN/GOALS: Continue supportive psychotherapy efforts and medications as indicated. Treatment and medication options discussed during today's visit. Patient acknowledged and verbally consented to continue with current treatment plan and was educated on the importance of compliance with treatment and follow-up appointments.    MEDICATION ISSUES:  Discussed medication options and treatment plan of prescribed  medication as well as the risks, benefits, and side effects including potential falls, possible impaired driving and metabolic adversities among others. Patient is agreeable to call the office with any worsening of symptoms or onset of side effects. Patient is agreeable to call 911 or go to the nearest ER should he/she begin having SI/HI.      [facility]  [address] [place] [address] [address] [place] [id number] [phone]    August 10, 2022 15:19 EDT

## 2022-08-17 ENCOUNTER — TELEPHONE (OUTPATIENT)
Dept: FAMILY MEDICINE CLINIC | Facility: CLINIC | Age: 22
End: 2022-08-17

## 2022-08-29 DIAGNOSIS — F41.1 GENERALIZED ANXIETY DISORDER: ICD-10-CM

## 2022-08-29 RX ORDER — PROPRANOLOL HYDROCHLORIDE 10 MG/1
TABLET ORAL
Qty: 90 TABLET | Refills: 2 | OUTPATIENT
Start: 2022-08-29

## 2022-10-28 DIAGNOSIS — F31.81 BIPOLAR 2 DISORDER: ICD-10-CM

## 2022-10-30 RX ORDER — LITHIUM CARBONATE 450 MG
450 TABLET, EXTENDED RELEASE ORAL NIGHTLY
Qty: 90 TABLET | OUTPATIENT
Start: 2022-10-30

## 2022-10-31 RX ORDER — LITHIUM CARBONATE 450 MG
450 TABLET, EXTENDED RELEASE ORAL NIGHTLY
Qty: 30 TABLET | Refills: 0 | Status: SHIPPED | OUTPATIENT
Start: 2022-10-31 | End: 2022-12-20 | Stop reason: SDUPTHER

## 2022-11-25 DIAGNOSIS — F41.1 GENERALIZED ANXIETY DISORDER: ICD-10-CM

## 2022-11-28 RX ORDER — PROPRANOLOL HYDROCHLORIDE 10 MG/1
TABLET ORAL
Qty: 90 TABLET | Refills: 2 | Status: SHIPPED | OUTPATIENT
Start: 2022-11-28 | End: 2023-03-06

## 2022-12-20 ENCOUNTER — OFFICE VISIT (OUTPATIENT)
Dept: BEHAVIORAL HEALTH | Facility: CLINIC | Age: 22
End: 2022-12-20

## 2022-12-20 VITALS — WEIGHT: 200.4 LBS | HEIGHT: 67 IN | BODY MASS INDEX: 31.45 KG/M2

## 2022-12-20 DIAGNOSIS — F31.81 BIPOLAR 2 DISORDER: Primary | ICD-10-CM

## 2022-12-20 DIAGNOSIS — F41.1 GENERALIZED ANXIETY DISORDER: ICD-10-CM

## 2022-12-20 PROCEDURE — 99214 OFFICE O/P EST MOD 30 MIN: CPT

## 2022-12-20 RX ORDER — LITHIUM CARBONATE 450 MG
450 TABLET, EXTENDED RELEASE ORAL NIGHTLY
Qty: 30 TABLET | Refills: 3 | Status: SHIPPED | OUTPATIENT
Start: 2022-12-20

## 2022-12-20 RX ORDER — TESTOSTERONE 20.25 MG/1.25G
20.25 GEL TOPICAL DAILY
COMMUNITY
Start: 2022-11-16

## 2022-12-20 RX ORDER — SERTRALINE HYDROCHLORIDE 100 MG/1
100 TABLET, FILM COATED ORAL DAILY
Qty: 30 TABLET | Refills: 3 | Status: SHIPPED | OUTPATIENT
Start: 2022-12-20

## 2022-12-20 NOTE — PROGRESS NOTES
Follow Up Office Visit    Patient Name: Rhona Diaz  : 2000   MRN: [id number]   Care Team: Patient Care Team:  Mary Bowman MD as PCP - General (Internal Medicine)  Christie Ahmadi APRN as Nurse Practitioner (Internal Medicine)  Abby Temple APRN as Nurse Practitioner (Behavioral Health)        Chief Complaint:    Chief Complaint   Patient presents with   • Bipolar   • Anxiety   • Med Management       History of Present Illness: Rhona Diaz is a 22 y.o. female who is here today for a medication management follow up. Patient states that medication continues to be effective. She feels that her mood is stable and anxiety is well managed. Patient is still working towards getting ehr top surgery. Insurance originally denied it, stating that she need to be on testosterone for 12 months before approval. She is currently doing that and states that she is feeling good. Patient did not not have any medication concerns at today's visit.     Subjective   Review of Systems:    Review of Systems   All other systems reviewed and are negative.      Current Medications:   Current Outpatient Medications   Medication Sig Dispense Refill   • drospirenone-ethinyl estradiol (STERLING,GIANVI) 3-0.02 MG per tablet Take 1 tablet by mouth Daily. 28 tablet 11   • lithium (ESKALITH) 450 MG CR tablet Take 1 tablet by mouth Every Night. 30 tablet 3   • propranolol (INDERAL) 10 MG tablet TAKE 1 TABLET BY MOUTH THREE TIMES DAILY AS NEEDED(ANXIETY/SLEEP/PANIC) 90 tablet 2   • sertraline (ZOLOFT) 100 MG tablet Take 1 tablet by mouth Daily. 30 tablet 3   • Testosterone 20.25 MG/1.25GM (1.62%) gel Place 20.25 mg on the skin as directed by provider Daily.       No current facility-administered medications for this visit.       Mental Status Exam:   Hygiene:   good  Cooperation:  Cooperative  Eye Contact:  Good  Psychomotor Behavior:  Appropriate  Affect:  Appropriate  Mood: normal  Speech:  Normal  Thought Process:  Goal  "directed and Linear  Thought Content:  Normal and Mood congruent  Suicidal:  None  Homicidal:  None  Hallucinations:  None  Delusion:  None  Memory:  Intact  Orientation:  Person, Place, Time and Situation  Reliability:  good  Insight:  Good  Judgement:  Good  Impulse Control:  Good  Physical/Medical Issues:  No      Objective   Vital Signs:   Ht 170.2 cm (67.01\")   Wt 90.9 kg (200 lb 6.4 oz)   BMI 31.38 kg/m²       Assessment / Plan    Diagnoses and all orders for this visit:    1. Bipolar 2 disorder (HCC) (Primary)  -     lithium (ESKALITH) 450 MG CR tablet; Take 1 tablet by mouth Every Night.  Dispense: 30 tablet; Refill: 3    2. Generalized anxiety disorder  -     sertraline (ZOLOFT) 100 MG tablet; Take 1 tablet by mouth Daily.  Dispense: 30 tablet; Refill: 3       Patient doing well on current medication. No indication for changes, will continue medication as ordered. Will obtain Lithium level at follow up.     A psychological evaluation was conducted in order to assess past and current level of functioning. Areas assessed included, but were not limited to: perception of social support, perception of ability to face and deal with challenges in life (positive functioning), anxiety symptoms, depressive symptoms, perspective on beliefs/belief system, coping skills for stress, intelligence level,  Therapeutic rapport was established. Interventions conducted today were geared towards incorporating medication management along with support for continued therapy. Education was also provided as to the med management with this provider and what to expect in subsequent sessions.      We discussed risks, benefits, goals and side effects of the above medication and the patient was agreeable with the plan. Patient was educated on the importance of compliance with treatment and follow-up appointments. Patient is aware to contact the [facility] with any worsening of symptoms. To call for questions or concerns and return " early if necessary. Patent is agreeable to go to the Emergency Department or call 911 should they begin SI/HI.      PHQ-2/PHQ-9: Depression Screening  Little Interest or Pleasure in Doing Things: 0-->not at all  Feeling Down, Depressed or Hopeless: 0-->not at all  PHQ-2 Total Score: 0  PHQ-9: Brief Depression Severity Measure Score: 0    PHQ-9 Score:   PHQ-9 Total Score: 0      Over the last two weeks, how often have you been bothered by the following problems?  Feeling nervous, anxious or on edge: Several days  Not being able to stop or control worrying: Not at all  Worrying too much about different things: Not at all  Trouble Relaxing: Not at all  Being so restless that it is hard to sit still: Not at all  Becoming easily annoyed or irritable: Not at all  Feeling afraid as if something awful might happen: Not at all  HAYDER 7 Total Score: 1  If you checked any problems, how difficult have these problems made it for you to do your work, take care of things at home, or get along with other people: Not difficult at all            MEDS ORDERED DURING VISIT:  New Medications Ordered This Visit   Medications   • lithium (ESKALITH) 450 MG CR tablet     Sig: Take 1 tablet by mouth Every Night.     Dispense:  30 tablet     Refill:  3   • sertraline (ZOLOFT) 100 MG tablet     Sig: Take 1 tablet by mouth Daily.     Dispense:  30 tablet     Refill:  3         Follow Up   Return in about 4 months (around 4/20/2023).  Patient was given instructions and counseling regarding her condition or for health maintenance advice. Please see specific information pulled into the AVS if appropriate.     TREATMENT PLAN/GOALS: Continue supportive psychotherapy efforts and medications as indicated. Treatment and medication options discussed during today's visit. Patient acknowledged and verbally consented to continue with current treatment plan and was educated on the importance of compliance with treatment and follow-up  appointments.    MEDICATION ISSUES:  Discussed medication options and treatment plan of prescribed medication as well as the risks, benefits, and side effects including potential falls, possible impaired driving and metabolic adversities among others. Patient is agreeable to call the office with any worsening of symptoms or onset of side effects. Patient is agreeable to call 911 or go to the nearest ER should he/she begin having SI/HI.      RITESH [facility] [address] [id number] [phone]    December 20, 2022 14:52 EST

## 2023-03-04 DIAGNOSIS — F41.1 GENERALIZED ANXIETY DISORDER: ICD-10-CM

## 2023-03-06 RX ORDER — PROPRANOLOL HYDROCHLORIDE 10 MG/1
TABLET ORAL
Qty: 90 TABLET | Refills: 0 | Status: SHIPPED | OUTPATIENT
Start: 2023-03-06